# Patient Record
Sex: FEMALE | Race: WHITE | Employment: OTHER | URBAN - METROPOLITAN AREA
[De-identification: names, ages, dates, MRNs, and addresses within clinical notes are randomized per-mention and may not be internally consistent; named-entity substitution may affect disease eponyms.]

---

## 2017-02-21 ENCOUNTER — HOSPITAL ENCOUNTER (OUTPATIENT)
Age: 72
Discharge: HOME | End: 2017-02-21
Payer: MEDICARE

## 2017-02-21 DIAGNOSIS — E78.5 HYPERLIPIDEMIA, UNSPECIFIED HYPERLIPIDEMIA TYPE: ICD-10-CM

## 2017-02-21 LAB
CHOLESTEROL/HDL RATIO: 2.3
CHOLESTEROL: 147 MG/DL
HDLC SERPL-MCNC: 65 MG/DL
LDL CHOLESTEROL: 58 MG/DL (ref 0–130)
TRIGL SERPL-MCNC: 121 MG/DL
VLDLC SERPL CALC-MCNC: NORMAL MG/DL (ref 1–30)

## 2017-02-21 PROCEDURE — 80061 LIPID PANEL: CPT

## 2017-02-21 PROCEDURE — 36415 COLL VENOUS BLD VENIPUNCTURE: CPT

## 2017-10-17 ENCOUNTER — HOSPITAL ENCOUNTER (OUTPATIENT)
Facility: CLINIC | Age: 72
Discharge: HOME | End: 2017-10-17
Payer: MEDICARE

## 2017-10-17 LAB
-: NORMAL
ALBUMIN SERPL-MCNC: 4.2 G/DL (ref 3.5–5.2)
ALBUMIN/GLOBULIN RATIO: 1.6 (ref 1–2.5)
ALP BLD-CCNC: 68 U/L (ref 35–104)
ALT SERPL-CCNC: 15 U/L (ref 5–33)
AMORPHOUS: NORMAL
ANION GAP SERPL CALCULATED.3IONS-SCNC: 12 MMOL/L (ref 9–17)
AST SERPL-CCNC: 16 U/L
BACTERIA: NORMAL
BILIRUB SERPL-MCNC: 0.78 MG/DL (ref 0.3–1.2)
BILIRUBIN URINE: NEGATIVE
BUN BLDV-MCNC: 16 MG/DL (ref 8–23)
BUN/CREAT BLD: NORMAL (ref 9–20)
CALCIUM SERPL-MCNC: 9.1 MG/DL (ref 8.6–10.4)
CASTS UA: NORMAL /LPF (ref 0–8)
CHLORIDE BLD-SCNC: 102 MMOL/L (ref 98–107)
CO2: 26 MMOL/L (ref 20–31)
COLOR: YELLOW
COMMENT UA: ABNORMAL
CREAT SERPL-MCNC: 0.51 MG/DL (ref 0.5–0.9)
CRYSTALS, UA: NORMAL /HPF
EPITHELIAL CELLS UA: NORMAL /HPF (ref 0–5)
ESTIMATED AVERAGE GLUCOSE: 117 MG/DL
GFR AFRICAN AMERICAN: >60 ML/MIN
GFR NON-AFRICAN AMERICAN: >60 ML/MIN
GFR SERPL CREATININE-BSD FRML MDRD: NORMAL ML/MIN/{1.73_M2}
GFR SERPL CREATININE-BSD FRML MDRD: NORMAL ML/MIN/{1.73_M2}
GLUCOSE BLD-MCNC: 96 MG/DL (ref 70–99)
GLUCOSE URINE: NEGATIVE
HBA1C MFR BLD: 5.7 % (ref 4–6)
KETONES, URINE: NEGATIVE
LEUKOCYTE ESTERASE, URINE: ABNORMAL
MUCUS: NORMAL
NITRITE, URINE: NEGATIVE
OTHER OBSERVATIONS UA: NORMAL
PH UA: 5.5 (ref 5–8)
POTASSIUM SERPL-SCNC: 4 MMOL/L (ref 3.7–5.3)
PROTEIN UA: NEGATIVE
RBC UA: NORMAL /HPF (ref 0–4)
RENAL EPITHELIAL, UA: NORMAL /HPF
SODIUM BLD-SCNC: 140 MMOL/L (ref 135–144)
SPECIFIC GRAVITY UA: 1.02 (ref 1–1.03)
TOTAL PROTEIN: 6.8 G/DL (ref 6.4–8.3)
TRICHOMONAS: NORMAL
TSH SERPL DL<=0.05 MIU/L-ACNC: 4.57 MIU/L (ref 0.3–5)
TURBIDITY: CLEAR
URINE HGB: NEGATIVE
UROBILINOGEN, URINE: NORMAL
WBC UA: NORMAL /HPF (ref 0–5)
YEAST: NORMAL

## 2017-10-17 PROCEDURE — 81001 URINALYSIS AUTO W/SCOPE: CPT

## 2017-10-17 PROCEDURE — 84443 ASSAY THYROID STIM HORMONE: CPT

## 2017-10-17 PROCEDURE — 36415 COLL VENOUS BLD VENIPUNCTURE: CPT

## 2017-10-17 PROCEDURE — 80053 COMPREHEN METABOLIC PANEL: CPT

## 2017-10-17 PROCEDURE — 83036 HEMOGLOBIN GLYCOSYLATED A1C: CPT

## 2018-09-08 ENCOUNTER — HOSPITAL ENCOUNTER (OUTPATIENT)
Age: 73
Discharge: HOME | End: 2018-09-08
Payer: MEDICARE

## 2018-09-08 LAB
-: NORMAL
ALBUMIN SERPL-MCNC: 4.4 G/DL (ref 3.5–5.2)
ALBUMIN/GLOBULIN RATIO: 1.6 (ref 1–2.5)
ALP BLD-CCNC: 76 U/L (ref 35–104)
ALT SERPL-CCNC: 18 U/L (ref 5–33)
AMORPHOUS: NORMAL
ANION GAP SERPL CALCULATED.3IONS-SCNC: 11 MMOL/L (ref 9–17)
AST SERPL-CCNC: 19 U/L
BACTERIA: NORMAL
BILIRUB SERPL-MCNC: 0.94 MG/DL (ref 0.3–1.2)
BILIRUBIN URINE: NEGATIVE
BUN BLDV-MCNC: 14 MG/DL (ref 8–23)
BUN/CREAT BLD: NORMAL (ref 9–20)
CALCIUM SERPL-MCNC: 9.5 MG/DL (ref 8.6–10.4)
CASTS UA: NORMAL /LPF (ref 0–8)
CHLORIDE BLD-SCNC: 100 MMOL/L (ref 98–107)
CHOLESTEROL, FASTING: 187 MG/DL
CHOLESTEROL/HDL RATIO: 3.2
CO2: 28 MMOL/L (ref 20–31)
COLOR: YELLOW
COMMENT UA: ABNORMAL
CREAT SERPL-MCNC: 0.62 MG/DL (ref 0.5–0.9)
CRYSTALS, UA: NORMAL /HPF
EPITHELIAL CELLS UA: NORMAL /HPF (ref 0–5)
GFR AFRICAN AMERICAN: >60 ML/MIN
GFR NON-AFRICAN AMERICAN: >60 ML/MIN
GFR SERPL CREATININE-BSD FRML MDRD: NORMAL ML/MIN/{1.73_M2}
GFR SERPL CREATININE-BSD FRML MDRD: NORMAL ML/MIN/{1.73_M2}
GLUCOSE FASTING: 96 MG/DL (ref 70–99)
GLUCOSE URINE: NEGATIVE
HDLC SERPL-MCNC: 59 MG/DL
KETONES, URINE: NEGATIVE
LDL CHOLESTEROL: 101 MG/DL (ref 0–130)
LEUKOCYTE ESTERASE, URINE: ABNORMAL
MUCUS: NORMAL
NITRITE, URINE: NEGATIVE
OTHER OBSERVATIONS UA: NORMAL
PH UA: 6.5 (ref 5–8)
POTASSIUM SERPL-SCNC: 4.1 MMOL/L (ref 3.7–5.3)
PROTEIN UA: NEGATIVE
RBC UA: NORMAL /HPF (ref 0–4)
RENAL EPITHELIAL, UA: NORMAL /HPF
SODIUM BLD-SCNC: 139 MMOL/L (ref 135–144)
SPECIFIC GRAVITY UA: 1.02 (ref 1–1.03)
TOTAL PROTEIN: 7.2 G/DL (ref 6.4–8.3)
TRICHOMONAS: NORMAL
TRIGLYCERIDE, FASTING: 137 MG/DL
TSH SERPL DL<=0.05 MIU/L-ACNC: 4.97 MIU/L (ref 0.3–5)
TURBIDITY: ABNORMAL
URINE HGB: NEGATIVE
UROBILINOGEN, URINE: NORMAL
VLDLC SERPL CALC-MCNC: NORMAL MG/DL (ref 1–30)
WBC UA: NORMAL /HPF (ref 0–5)
YEAST: NORMAL

## 2018-09-08 PROCEDURE — 36415 COLL VENOUS BLD VENIPUNCTURE: CPT

## 2018-09-08 PROCEDURE — 80061 LIPID PANEL: CPT

## 2018-09-08 PROCEDURE — 84443 ASSAY THYROID STIM HORMONE: CPT

## 2018-09-08 PROCEDURE — 80053 COMPREHEN METABOLIC PANEL: CPT

## 2018-09-08 PROCEDURE — 81001 URINALYSIS AUTO W/SCOPE: CPT

## 2018-10-19 ENCOUNTER — HOSPITAL ENCOUNTER (OUTPATIENT)
Age: 73
Discharge: HOME | End: 2018-10-19
Payer: MEDICARE

## 2018-10-19 DIAGNOSIS — E78.5 HYPERLIPIDEMIA, UNSPECIFIED HYPERLIPIDEMIA TYPE: ICD-10-CM

## 2018-10-19 LAB
CHOLESTEROL/HDL RATIO: 3.5
CHOLESTEROL: 211 MG/DL
HDLC SERPL-MCNC: 60 MG/DL
LDL CHOLESTEROL: 132 MG/DL (ref 0–130)
TRIGL SERPL-MCNC: 97 MG/DL
VLDLC SERPL CALC-MCNC: ABNORMAL MG/DL (ref 1–30)

## 2018-10-19 PROCEDURE — 36415 COLL VENOUS BLD VENIPUNCTURE: CPT

## 2018-10-19 PROCEDURE — 80061 LIPID PANEL: CPT

## 2019-10-02 ENCOUNTER — HOSPITAL ENCOUNTER (OUTPATIENT)
Age: 74
Discharge: HOME | End: 2019-10-02
Payer: MEDICARE

## 2019-10-02 LAB
-: NORMAL
ALBUMIN SERPL-MCNC: 4.5 G/DL (ref 3.5–5.2)
ALBUMIN/GLOBULIN RATIO: 1.5 (ref 1–2.5)
ALP BLD-CCNC: 78 U/L (ref 35–104)
ALT SERPL-CCNC: 17 U/L (ref 5–33)
AMORPHOUS: NORMAL
ANION GAP SERPL CALCULATED.3IONS-SCNC: 11 MMOL/L (ref 9–17)
AST SERPL-CCNC: 18 U/L
BACTERIA: NORMAL
BILIRUB SERPL-MCNC: 0.81 MG/DL (ref 0.3–1.2)
BILIRUBIN URINE: NEGATIVE
BUN BLDV-MCNC: 17 MG/DL (ref 8–23)
BUN/CREAT BLD: NORMAL (ref 9–20)
CALCIUM SERPL-MCNC: 9.7 MG/DL (ref 8.6–10.4)
CASTS UA: NORMAL /LPF (ref 0–8)
CHLORIDE BLD-SCNC: 103 MMOL/L (ref 98–107)
CHOLESTEROL, FASTING: 235 MG/DL
CHOLESTEROL/HDL RATIO: 3.7
CO2: 27 MMOL/L (ref 20–31)
COLOR: YELLOW
COMMENT UA: ABNORMAL
CREAT SERPL-MCNC: 0.57 MG/DL (ref 0.5–0.9)
CRYSTALS, UA: NORMAL /HPF
EPITHELIAL CELLS UA: NORMAL /HPF (ref 0–5)
ESTIMATED AVERAGE GLUCOSE: 120 MG/DL
GFR AFRICAN AMERICAN: >60 ML/MIN
GFR NON-AFRICAN AMERICAN: >60 ML/MIN
GFR SERPL CREATININE-BSD FRML MDRD: NORMAL ML/MIN/{1.73_M2}
GFR SERPL CREATININE-BSD FRML MDRD: NORMAL ML/MIN/{1.73_M2}
GLUCOSE BLD-MCNC: 99 MG/DL (ref 70–99)
GLUCOSE URINE: NEGATIVE
HBA1C MFR BLD: 5.8 % (ref 4–6)
HDLC SERPL-MCNC: 64 MG/DL
KETONES, URINE: NEGATIVE
LDL CHOLESTEROL: 151 MG/DL (ref 0–130)
LEUKOCYTE ESTERASE, URINE: ABNORMAL
MUCUS: NORMAL
NITRITE, URINE: NEGATIVE
OTHER OBSERVATIONS UA: NORMAL
PH UA: 6.5 (ref 5–8)
POTASSIUM SERPL-SCNC: 4 MMOL/L (ref 3.7–5.3)
PROTEIN UA: NEGATIVE
RBC UA: NORMAL /HPF (ref 0–4)
RENAL EPITHELIAL, UA: NORMAL /HPF
SODIUM BLD-SCNC: 141 MMOL/L (ref 135–144)
SPECIFIC GRAVITY UA: 1.02 (ref 1–1.03)
TOTAL PROTEIN: 7.6 G/DL (ref 6.4–8.3)
TRICHOMONAS: NORMAL
TRIGLYCERIDE, FASTING: 100 MG/DL
TSH SERPL DL<=0.05 MIU/L-ACNC: 6.3 MIU/L (ref 0.3–5)
TURBIDITY: CLEAR
URINE HGB: NEGATIVE
UROBILINOGEN, URINE: NORMAL
VLDLC SERPL CALC-MCNC: ABNORMAL MG/DL (ref 1–30)
WBC UA: NORMAL /HPF (ref 0–5)
YEAST: NORMAL

## 2019-10-02 PROCEDURE — 81001 URINALYSIS AUTO W/SCOPE: CPT

## 2019-10-02 PROCEDURE — 36415 COLL VENOUS BLD VENIPUNCTURE: CPT

## 2019-10-02 PROCEDURE — 80053 COMPREHEN METABOLIC PANEL: CPT

## 2019-10-02 PROCEDURE — 83036 HEMOGLOBIN GLYCOSYLATED A1C: CPT

## 2019-10-02 PROCEDURE — 84443 ASSAY THYROID STIM HORMONE: CPT

## 2019-10-02 PROCEDURE — 80061 LIPID PANEL: CPT

## 2019-12-11 PROBLEM — Z78.9 STATIN INTOLERANCE: Status: ACTIVE | Noted: 2019-12-11

## 2020-10-17 ENCOUNTER — HOSPITAL ENCOUNTER (OUTPATIENT)
Age: 75
Discharge: HOME | End: 2020-10-17
Payer: MEDICARE

## 2020-10-17 LAB
-: NORMAL
ALBUMIN SERPL-MCNC: 4.5 G/DL (ref 3.5–5.2)
ALBUMIN/GLOBULIN RATIO: 1.8 (ref 1–2.5)
ALP BLD-CCNC: 88 U/L (ref 35–104)
ALT SERPL-CCNC: 15 U/L (ref 5–33)
AMORPHOUS: NORMAL
ANION GAP SERPL CALCULATED.3IONS-SCNC: 13 MMOL/L (ref 9–17)
AST SERPL-CCNC: 17 U/L
BACTERIA: NORMAL
BILIRUB SERPL-MCNC: 0.72 MG/DL (ref 0.3–1.2)
BILIRUBIN URINE: NEGATIVE
BUN BLDV-MCNC: 12 MG/DL (ref 8–23)
BUN/CREAT BLD: NORMAL (ref 9–20)
CALCIUM SERPL-MCNC: 9.5 MG/DL (ref 8.6–10.4)
CASTS UA: NORMAL /LPF (ref 0–8)
CHLORIDE BLD-SCNC: 103 MMOL/L (ref 98–107)
CHOLESTEROL, FASTING: 255 MG/DL
CHOLESTEROL/HDL RATIO: 3.5
CO2: 26 MMOL/L (ref 20–31)
COLOR: YELLOW
COMMENT UA: ABNORMAL
CREAT SERPL-MCNC: 0.55 MG/DL (ref 0.5–0.9)
CRYSTALS, UA: NORMAL /HPF
EPITHELIAL CELLS UA: NORMAL /HPF (ref 0–5)
GFR AFRICAN AMERICAN: >60 ML/MIN
GFR NON-AFRICAN AMERICAN: >60 ML/MIN
GFR SERPL CREATININE-BSD FRML MDRD: NORMAL ML/MIN/{1.73_M2}
GFR SERPL CREATININE-BSD FRML MDRD: NORMAL ML/MIN/{1.73_M2}
GLUCOSE FASTING: 92 MG/DL (ref 70–99)
GLUCOSE URINE: NEGATIVE
HDLC SERPL-MCNC: 72 MG/DL
KETONES, URINE: NEGATIVE
LDL CHOLESTEROL: 160 MG/DL (ref 0–130)
LEUKOCYTE ESTERASE, URINE: ABNORMAL
MUCUS: NORMAL
NITRITE, URINE: NEGATIVE
OTHER OBSERVATIONS UA: NORMAL
PH UA: 6.5 (ref 5–8)
POTASSIUM SERPL-SCNC: 4.1 MMOL/L (ref 3.7–5.3)
PROTEIN UA: NEGATIVE
RBC UA: NORMAL /HPF (ref 0–4)
RENAL EPITHELIAL, UA: NORMAL /HPF
SODIUM BLD-SCNC: 142 MMOL/L (ref 135–144)
SPECIFIC GRAVITY UA: 1.01 (ref 1–1.03)
TOTAL PROTEIN: 7 G/DL (ref 6.4–8.3)
TRICHOMONAS: NORMAL
TRIGLYCERIDE, FASTING: 115 MG/DL
TSH SERPL DL<=0.05 MIU/L-ACNC: 5.27 MIU/L (ref 0.3–5)
TURBIDITY: CLEAR
URINE HGB: NEGATIVE
UROBILINOGEN, URINE: NORMAL
VLDLC SERPL CALC-MCNC: ABNORMAL MG/DL (ref 1–30)
WBC UA: NORMAL /HPF (ref 0–5)
YEAST: NORMAL

## 2020-10-17 PROCEDURE — 84443 ASSAY THYROID STIM HORMONE: CPT

## 2020-10-17 PROCEDURE — 80053 COMPREHEN METABOLIC PANEL: CPT

## 2020-10-17 PROCEDURE — 83036 HEMOGLOBIN GLYCOSYLATED A1C: CPT

## 2020-10-17 PROCEDURE — 80061 LIPID PANEL: CPT

## 2020-10-17 PROCEDURE — 36415 COLL VENOUS BLD VENIPUNCTURE: CPT

## 2020-10-17 PROCEDURE — 81001 URINALYSIS AUTO W/SCOPE: CPT

## 2020-10-18 LAB
ESTIMATED AVERAGE GLUCOSE: 126 MG/DL
HBA1C MFR BLD: 6 % (ref 4–6)

## 2020-12-18 ENCOUNTER — HOSPITAL ENCOUNTER (OUTPATIENT)
Age: 75
Setting detail: SPECIMEN
Discharge: HOME | End: 2020-12-18
Payer: MEDICARE

## 2020-12-18 ENCOUNTER — HOSPITAL ENCOUNTER (OUTPATIENT)
Age: 75
Discharge: HOME | End: 2020-12-18
Payer: MEDICARE

## 2020-12-20 LAB — HIV AG/AB: NONREACTIVE

## 2021-09-03 ENCOUNTER — HOSPITAL ENCOUNTER (OUTPATIENT)
Age: 76
Discharge: HOME | End: 2021-09-05
Payer: MEDICARE

## 2021-09-03 ENCOUNTER — HOSPITAL ENCOUNTER (OUTPATIENT)
Dept: GENERAL RADIOLOGY | Age: 76
Discharge: HOME | End: 2021-09-05
Payer: MEDICARE

## 2021-09-03 DIAGNOSIS — M25.552 LEFT HIP PAIN: ICD-10-CM

## 2021-09-03 DIAGNOSIS — M54.42 LOW BACK PAIN WITH LEFT-SIDED SCIATICA, UNSPECIFIED BACK PAIN LATERALITY, UNSPECIFIED CHRONICITY: ICD-10-CM

## 2021-09-03 PROCEDURE — 73502 X-RAY EXAM HIP UNI 2-3 VIEWS: CPT

## 2021-09-03 PROCEDURE — 72100 X-RAY EXAM L-S SPINE 2/3 VWS: CPT

## 2021-09-20 ENCOUNTER — HOSPITAL ENCOUNTER (OUTPATIENT)
Dept: PHYSICAL THERAPY | Facility: CLINIC | Age: 76
Setting detail: THERAPIES SERIES
Discharge: HOME | End: 2021-09-20
Payer: MEDICARE

## 2021-09-20 PROCEDURE — 97140 MANUAL THERAPY 1/> REGIONS: CPT

## 2021-09-20 PROCEDURE — 97161 PT EVAL LOW COMPLEX 20 MIN: CPT

## 2021-09-20 PROCEDURE — 97110 THERAPEUTIC EXERCISES: CPT

## 2021-09-20 NOTE — CONSULTS
[] [facility]) - [facility] &  Therapy  [address]  [phone]  F: [phone] [] [address]  [facility]   [address]  P: [phone]  F: [phone] [] [address] &  Therapy  [address]  P: [phone]  F: [phone] [] [address]  P: [phone]  F: [phone] [] [address]  [facility]   [address]   [place]: [phone]  F: [phone]      Physical Therapy Spine Evaluation    Date:  2021  Patient: Brea Loya  :   MRN: [id number]  Physician: Dr. Karlee Lora: Kassy Ledbetter ($0 copay, ded met, 20%)  Medical Diagnosis: Acute back pain  Rehab Codes: M54.5  Onset Date: 21  Next 's appt.: none    Subjective:   CC:L ant hip and L medial lower leg pain  HPI: saw Dr Violeta Hernandez at [facility] who recommended surgery. Was fine for 5 years. Then recently, it flared up. L buttock and groin/ant hip pain. Was walking 2 miles/day for past year. Then 6 wks ago, reduced walking to 1/2 mile due to L knee pain, calf and N/T down medial leg. Enjoys reading, yoga, and swim without issues. Goal is to get back to walking 2 miles/day. L BON feels like it wont' hold her and has increased fear of falling.       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  There are 5 lumbar-type   vertebral bodies without ribs.  The pedicles and posterior spinous processes   appear intact.  There is approximately 12 mm anterolisthesis L5 on S1 with   marked narrowing of the L5-S1 disc space and prominent osteophytic changes. Bilateral pars defects at this level.  There is approximately 3 mm   anterolisthesis L3 on L4 approximately 4 mm posterior listhesis L4 and L5. There is some narrowing of the L3-4 disc space.  Some degenerative changes in   the vertebral endplates throughout.  SI joints are unremarkable in appearance.       The left hip appears unremarkable and the joint space also appears   unremarkable.  Normal alignment.  Also noted are vascular calcifications in   the soft tissues.           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       Unremarkable appearance of the left hip.       No evidence of acute abnormality. Alignment: A 8mm anterolisthesis of L5 on S1.          Disk Spaces: L5-S1 severe disk height loss with endplate sclerosis.       Pars/Facets: Right-sided L5 spondylolysis.        Other: None.           Impression       Grade 1 anterolisthesis of L5 on S1.  Right-sided L5 spondylolysis.  Severe L5-S1 disk height loss.            [] MRI:   At L3-L4 there is a posterior disc bulge, facet hypertrophy and thickening of ligamentum flavum.  Findings result in moderate narrowing of the spinal canal, mild narrowing of the left neuroforamen and mild narrowing of the right neuroforamen.    At L4-L5 there is a posterior disc bulge, facet hypertrophy and thickening of ligamentum flavum.  Findings result in mild narrowing of the spinal canal, and mild narrowing of the spinal canal.   At L5-S1 there is a posterior disc bulge, facet hypertrophy and thickening of ligamentum flavum.  Findings result in mild narrowing of the spinal canal and severe narrowing of the neuroforamen.           Impression   1.  Multilevel degenerative changes most pronounced at L5-S1 where there is mild narrowing of the spinal canal and severe narrowing of the neuroforamen.            [] Other:    Medications: [x] Refer to full medical record [] None [] Other:  Allergies:      [x] Refer to full medical record [] None [] Other:    Function:  Hand Dominance  [] Right  [] Left  Patient lives with: alone   In what type of home []  One story   [x] Two story   [] Split level   Number of stairs to enter 1    With handrail on the []  Right to enter   [] Left to enter   Bathroom has a []  Tub only  [x] Tub/shower combo   [] Walk in shower    []  Grab bars   Washing machine is on []  Main level   [] Second level   [] Basement   Employer retired   Job Status []  Normal duty   [] Light duty   [] Off due to condition    []  Retired   [] Not employed   [] Disability  [] Other:  []  Return to work:    Work activities/duties Walk up to 2 miles       ADL/IADL Previous level of function Current level of function Who currently assists the patient with task   Bathing  [] Independent  [] Assist [x] Independent  [] Assist    Dress/grooming [] Independent  [] Assist [x] Independent  [] Assist    Transfer/mobility [] Independent  [] Assist [x] Independent  [] Assist    Feeding [] Independent  [] Assist [x] Independent  [] Assist    Toileting [] Independent  [] Assist [x] Independent  [] Assist    Driving [] Independent  [] Assist [x] Independent  [] Assist    Housekeeping [] Independent  [] Assist [x] Independent  [] Assist    Grocery shop/meal prep [] Independent  [] Assist [x] Independent  [] Assist      Gait Prior level of function Current level of function    [] Independent  [] Assist [x] Independent  [] Assist   Device: [] Independent [x] Independent    [] Straight Cane [] Quad cane [] Straight Cane [] Quad cane    [] Standard walker [] Rolling walker   [] 4 wheeled walker [] Standard walker [] Rolling walker   [] 4 wheeled walker    [] Wheelchair [] Wheelchair     Pain:  [x] Yes  [] No Location: L ant hip, L medial lower leg Pain Rating: (0-10 scale) 2/10  Pain altered Tx:  [] Yes  [x] No  Action:    Symptoms:  [x] Improving [] Worsening [] Same  Better:  [] AM    [] PM    [] Sit    [] Rise/Sit    []Stand    [] Walk    [] Lying    [] Other:  Worse: [] AM    [] PM    [] Sit    [] Rise/Sit    []Stand    [x] Walk    [] Lying    [] Bend                      [] Valsalva    [] Other:  Sleep: [] OK    [x] Disturbed due to apnea    Objective:   STRENGTH  ROM    Left Right     L1-2 Hip Flex       Hip Abd       L3-4 Knee Ext        L4 Ankle DF        L5 EHL       S1 Plant. Flex   Lumbar    Abdominals   Flexion wfl   Erector Spinae   Extension ltd      Rotation L  R      Sidebend L R      UE/LE                                               TESTS (+/-) LEFT RIGHT Not Tested   SLR [] sit [x] supine neg neg []   Hamstring (SLR)   []   SKTC neg neg []   DKTC   []   Slump/Dural   []   SI JT   []   ETHEL neg neg []       OBSERVATION No Deficit Deficit Not Tested Comments   Posture       Forward Head [] [] []    Rounded Shoulders [] [] []    Kyphosis [] [] []    Lordosis [] [] []    Lateral Shift [] [] []    Scoliosis [] [] []    Iliac Crest [] [] []    PSIS [] [] []    ASIS [] [] []    Genu Valgus [] [] []    Genu Varus [] [] []    Genu Recurvatum [] [] []    Pronation [] [] []    Supination [] [] []    Leg Length Discrp [] [] []    Slumped Sitting [] [] []    Palpation [] [x] [] L buttocks, L calf   Sensation [] [] []    Edema [x] [] []    Neurological [x] [] []        Functional Test: LEFI Score: 1% functionally impaired     Comments:    Assessment:  Patient would benefit from skilled physical therapy services in order to: decrease L hip and lower leg symptoms    Problems:    [x] ? Pain:  [x] ? ROM:  [x] ? Strength:  [x] ? Function:  [] Other:      STG: (to be met in 10 treatments)  1. ? paresthesias into L lower leg  2. ? ROM: of L spine and hip   3. ? Strength: to at least 4/5 with L hip ext and abd.  4. ? Functional tolerance of sitting and standing  5. Patient to be independent with home exercise program as demonstrated by performance with correct form without cues. 6. Demonstrate Knowledge of fall prevention  LTG: (to be met in 20 treatments)  1. Minimal to no feelings of falling  2. Minimal to no parasthesias into L LE      Patient goals: :keep from falling\"     Rehab Potential:  [x] Good  [] Fair  [] Poor   Suggested Professional Referral:  [x] No  [] Yes:  Barriers to Goal Achievement:  [] No  [] Yes:  Domestic Concerns:  [x] No  [] Yes:    Pt. Education:  [x] Plans/Goals, Risks/Benefits discussed  [x] Home exercise program  Method of Education: [] Verbal  [] Demo  [x] Written  Comprehension of Education:  [] Verbalizes understanding. [] Demonstrates understanding. [x] Needs Review. [] Demonstrates/verbalizes understanding of HEP/Ed previously given.     Treatment Plan:  [x] Therapeutic Exercise   11723  [] Iontophoresis: 4 mg/mL Dexamethasone Sodium Phosphate  mAmin  84588   [x] Therapeutic Activity  83490 [] Vasopneumatic cold with compression  30808    [x] Gait Training   89026 [] Ultrasound   33089   [x] Neuromuscular Re-education  42156 [] Electrical Stimulation Unattended  89661   [x] Manual Therapy  21505 [] Electrical Stimulation Attended  92523   [x] Instruction in HEP  [] Lumbar/Cervical Traction  99534   [x] Aquatic Therapy   32683 [] Cold/hotpack    [] Massage   11620      [] Dry Needling, 1 or 2 muscles  79932   [] Biofeedback, first 15 minutes   26727  [] Biofeedback, additional 15 minutes   46394 [] Dry Needling, 3 or more muscles  08627       Frequency:  2x/week for20 visits    Todays Treatment:  Modalities: none  Precautions: standard  Exercises:  Exercise Reps/ Time Weight/ Level completed Comments   Nustep *      Supine        Bridges 2x10  x    Wero stretch 3x30\"  x    Glut sets *      Prone hip ext 2x10  x           Sidelying       Hip abd 2x10  x    Clamshells 2x10  x Gym       Sit to stand from chair 2x10      Lunges *      Lateral step downs * 4\"            Other:  Manual  1. IASTM to L buttock, L calf, L quad  2.  DI to L hip flexor     Specific Instructions for next treatment:  1. Continue with manual and add ex for       Evaluation Complexity:  History (Personal factors, comorbidities) [x] 0 [] 1-2 [] 3+   Exam (limitations, restrictions) [x] 1-2 [] 3 [] 4+   Clinical presentation (progression) [x] Stable [] Evolving  [] Unstable   Decision Making [x] Low [] Moderate [] High    [x] Low Complexity [] Moderate Complexity [] High Complexity       Treatment Charges: Mins Units   [x] Evaluation       [x]  Low       []  Moderate       []  High  1   []  Modalities     [x]  Ther Exercise 15 1   [x]  Manual Therapy 10 1   []  Ther Activities     []  Aquatics     []  Vasocompression     []  Other       TOTAL TREATMENT TIME: 57    Time in: 1500     Time out: 1600    Electronically signed by: Erick Griffin PT        Physician Signature:________________________________Date:__________________  By signing above or cosigning this note, I have reviewed this plan of care and certify a need for medically necessary rehabilitation services.      *PLEASE SIGN ABOVE AND FAX BACK ALL PAGES*

## 2021-09-23 ENCOUNTER — HOSPITAL ENCOUNTER (OUTPATIENT)
Dept: PHYSICAL THERAPY | Facility: CLINIC | Age: 76
Setting detail: THERAPIES SERIES
Discharge: HOME | End: 2021-09-23
Payer: MEDICARE

## 2021-09-23 PROCEDURE — 97140 MANUAL THERAPY 1/> REGIONS: CPT

## 2021-09-23 PROCEDURE — 97110 THERAPEUTIC EXERCISES: CPT

## 2021-09-23 NOTE — FLOWSHEET NOTE
[] [facility]) [facility]  [facility]  [address]  [phone]  F: [phone] [] [address]  [facility] [address]   [address]  P: [phone]  F: [phone] [] [address] &  Therapy  [address]  P: [phone]  F: [phone] [] [address]  P: [phone]  F: [phone] [] [address]  [facility]   [address]   [place]: [phone]  F: [phone]      Physical Therapy Daily Treatment Note    Date:  2021  Patient Name:  Greta Quezada    :  1945  MRN: [id number]  Physician: Dr. Jody Hudson: Magdalene Patel ($0 copay, ded met, 20%)  Medical Diagnosis: Acute back pain                        Rehab Codes: M54.5  Onset Date: 21               Next 's appt.: none  Visit# / total visits: ; Progress note for Medicare patient due at visit 10     Cancels/No Shows:     Subjective:    Pain:  [x] Yes  [] No Location: L glut, tailbone Pain Rating: (0-10 scale) 2-3/10  Pain altered Tx:  [] No  [] Yes  Action:  Comments: Pt states she is very sore following the hypervolt from last visit. Objective:  Modalities: none  Precautions: standard  Exercises:  Exercise Reps/ Time Weight/ Level completed Comments   Nustep 5' L2 x     Calf stretch 3x30\"  x           Supine            Piriformis Stretch 3x30\"  x    Bridges 2x10   x     Wero stretch 1'   x     Glut sets 10x10\"   x     Prone hip ext 2x10   -                 Sidelying           Hip abd 2x10 orange x     Clamshells 2x10 Orange x                 Gym           Mat Taps 2x10   x     Lunges x10 B   x     Lateral step downs 2x10 L 4\" x     tband sidestepping 2L orange x     Other:  Manual  1.   IASTM to L buttock, L calf, L quad  2.  DI to L hip flexor      Specific Instructions for next treatment:  1. Continue with manual and add ex for          Treatment Charges: Mins Units   []  Modalities     [x]  Ther Exercise 35 2   [x]  Manual Therapy 10 1   []  Ther Activities     []  Aquatics     []  Vasocompression     []  Other     Total Treatment time 45 3       Assessment: [x] Progressing toward goals. Initiated session with Nustep warm up followed by manual. Most TTP at hip flexor and L glut. Cont with mat ex as noted in log, added tband to clamshells and hip abd. Noted increased N/T sx with SL abd, mat taps, and lateral step downs. Needs cues to avoid compensating with quad during lateral hip ex. Reviewed HEP and given orange tband. [] No change. [] Other:  [x] Patient would continue to benefit from skilled physical therapy services in order to: meet goals listed below    STG: (to be met in 10 treatments)  1. ? paresthesias into L lower leg  2. ? ROM: of L spine and hip   3. ? Strength: to at least 4/5 with L hip ext and abd.  4. ? Functional tolerance of sitting and standing  5. Patient to be independent with home exercise program as demonstrated by performance with correct form without cues. 6. Demonstrate Knowledge of fall prevention  LTG: (to be met in 20 treatments)  1. Minimal to no feelings of falling  2. Minimal to no parasthesias into L LE     Pt. Education:  [x] Yes  [] No  [x] Reviewed Prior HEP/Ed  Method of Education: [x] Verbal  [x] Demo  [] Written  Comprehension of Education:  [x] Verbalizes understanding. [x] Demonstrates understanding. [] Needs review. [] Demonstrates/verbalizes HEP/Ed previously given. Plan: [x] Continue current frequency toward long and short term goals.     [] Specific Instructions for subsequent treatments:       Time In: 10:02 am            Time Out: 10:58 am    Electronically signed by:  Destini Sebastian PTA

## 2021-09-27 ENCOUNTER — HOSPITAL ENCOUNTER (OUTPATIENT)
Dept: PHYSICAL THERAPY | Facility: CLINIC | Age: 76
Setting detail: THERAPIES SERIES
Discharge: HOME | End: 2021-09-27
Payer: MEDICARE

## 2021-09-27 PROCEDURE — 97110 THERAPEUTIC EXERCISES: CPT

## 2021-09-27 PROCEDURE — 97140 MANUAL THERAPY 1/> REGIONS: CPT

## 2021-09-27 NOTE — FLOWSHEET NOTE
[] [facility]) [facility]  [facility]  [address]  [phone]  F: [phone] [] [address]  [place] [facility] [address]   [address]  P: [phone]  F: [phone] [] [address] &  Therapy  [address] [facility] [address]  P: [phone]  F: [phone] [] [address]  P: [phone]  F: [phone] [] [address]  [facility]   [address]   [place]: [phone]  F: [phone]      Physical Therapy Daily Treatment Note    Date:  2021  Patient Name:  Lissy Olivares    :  1945  MRN: [id number]  Physician: Dr. Paz Manton: [id number] ($0 copay, ded met, 20%)  Medical Diagnosis: Acute back pain                        Rehab Codes: M54.5  Onset Date: 21               Next 's appt.: none  Visit# / total visits: 3/20; Progress note for Medicare patient due at visit 10     Cancels/No Shows:     Subjective:    Pain:  [x] Yes  [] No Location: L glut, tailbone Pain Rating: (0-10 scale) -/10  Pain altered Tx:  [] No  [] Yes  Action:  Comments: Pt states hip is less tender. Having some numbness down LLE but notices it is less since starting PT.     Objective:  Modalities: none  Precautions: standard  Exercises:  Exercise Reps/ Time Weight/ Level completed Comments   Nustep 5' L2 x     Calf stretch 3x30\"             Supine            Piriformis Stretch 3x30\"  x    Bridges 2x10  orange x     Wero stretch 1'   x     Glut sets 10x10\"   x     Prone hip ext 2x10   x Knee bent & straight               Sidelying           Hip abd 2x10 orange x     Clamshells 2x10 Orange x                 Gym           Mat Taps 2x10   x     Lunges x10 B   x     Lateral step downs 2x10 L 4\" x     tband sidestepping 2L orange x   Other: Manual  1. IASTM to L buttock, L calf, L quad  2.  DI to L hip flexor       Specific Instructions for next treatment:    Treatment Charges: Mins Units   []  Modalities     [x]  Ther Exercise 35 2   [x]  Manual Therapy 12 1   []  Ther Activities     []  Aquatics     []  Vasocompression     []  Other     Total Treatment time 47 3       Assessment: [x] Progressing toward goals. Initiated session with Nustep warm up followed by manual. Most tender at hip flexor and piriformis. Cont with strengthening as noted in log above with addition of 3 way hip. Cues needed for controlled movements as well as proper knee/hip alignment with ex. Less N/T this date, only occurring during lateral step down.     [] No change. [] Other:  [x] Patient would continue to benefit from skilled physical therapy services in order to: meet goals listed below    STG: (to be met in 10 treatments)  1. ? paresthesias into L lower leg  2. ? ROM: of L spine and hip   3. ? Strength: to at least 4/5 with L hip ext and abd.  4. ? Functional tolerance of sitting and standing  5. Patient to be independent with home exercise program as demonstrated by performance with correct form without cues. 6. Demonstrate Knowledge of fall prevention  LTG: (to be met in 20 treatments)  1. Minimal to no feelings of falling  2. Minimal to no parasthesias into L LE     Pt. Education:  [x] Yes  [] No  [x] Reviewed Prior HEP/Ed  Method of Education: [x] Verbal  [x] Demo  [] Written  Comprehension of Education:  [x] Verbalizes understanding. [x] Demonstrates understanding. [] Needs review. [] Demonstrates/verbalizes HEP/Ed previously given. Plan: [x] Continue current frequency toward long and short term goals.     [] Specific Instructions for subsequent treatments:       Time In: 3:00 pm        Time Out: 3:56 pm    Electronically signed by:  Starr Jones PTA

## 2021-09-30 ENCOUNTER — HOSPITAL ENCOUNTER (OUTPATIENT)
Dept: PHYSICAL THERAPY | Facility: CLINIC | Age: 76
Setting detail: THERAPIES SERIES
Discharge: HOME | End: 2021-09-30
Payer: MEDICARE

## 2021-09-30 PROCEDURE — 97140 MANUAL THERAPY 1/> REGIONS: CPT

## 2021-09-30 PROCEDURE — 97110 THERAPEUTIC EXERCISES: CPT

## 2021-09-30 NOTE — FLOWSHEET NOTE
[] [facility]) [facility]  [facility]  [address]  [phone]  F: [phone] [] [address]  [facility] [address]   [address]  P: [phone]  F: [phone] [] [address] &  Therapy  [address]  P: [phone]  F: [phone] [] [address]  P: [phone]  F: [phone] [] [address]  [facility]   [address]   [place]: [phone]  F: [phone]      Physical Therapy Daily Treatment Note    Date:  2021  Patient Name:  Elo Vera    :  1945  MRN: [id number]  Physician: Dr. Sharp Overall: [place] ($0 copay, ded met, 20%)  Medical Diagnosis: Acute back pain                        Rehab Codes: M54.5  Onset Date: 21               Next 's appt.: none  Visit# / total visits: ; Progress note for Medicare patient due at visit 10     Cancels/No Shows:     Subjective:    Pain:  [x] Yes  [] No Location: L glut, tailbone Pain Rating: (0-10 scale) -/10  Pain altered Tx:  [] No  [] Yes  Action:  Comments: No pain to report at arrival but pt is having some N/T down the LLE. Objective:  Modalities: none  Precautions: standard  Exercises:  Exercise Reps/ Time Weight/ Level completed Comments   Nustep 5' L2 x     Calf stretch 3x30\"             Supine            Piriformis Stretch 3x30\"  x    Bridges 2x10 lime x     Wero stretch 1' ea   x     Glut sets 10x10\"        Prone hip ext 2x10    Knee bent & straight               Sidelying           Hip abd 2x10 lime x     Clamshells 2x10 lime x                 Gym           Mat Taps 2x10   x    Lunges 2x10 B   x     Lateral step downs 2x10 L 4\" x     tband sidestepping 2L lime x     Other:  Manual  1.   IASTM to L buttock, L calf, L quad  2.  DI to L hip flexor       Specific Instructions for next treatment:    Treatment Charges: Mins Units   []  Modalities     [x]  Ther Exercise 40 3   [x]  Manual Therapy 15 1   []  Ther Activities     []  Aquatics     []  Vasocompression     []  Other     Total Treatment time 55 4       Assessment: [x] Progressing toward goals. Cont with manual noted above. DI to proximal hip flexor followed by hypervolt to quad, buttock, and calf. Able to increase tband resistance across program with good tolerance. Did have increased N/T down LLE with mat taps and tband sidestepping but diminished after adjusting technique to include more hip hinge. Reviewed HEP and given lime tband. [] No change. [] Other:  [x] Patient would continue to benefit from skilled physical therapy services in order to: meet goals listed below    STG: (to be met in 10 treatments)  1. ? paresthesias into L lower leg  2. ? ROM: of L spine and hip   3. ? Strength: to at least 4/5 with L hip ext and abd.  4. ? Functional tolerance of sitting and standing  5. Patient to be independent with home exercise program as demonstrated by performance with correct form without cues. 6. Demonstrate Knowledge of fall prevention  LTG: (to be met in 20 treatments)  1. Minimal to no feelings of falling  2. Minimal to no parasthesias into L LE     Pt. Education:  [x] Yes  [] No  [x] Reviewed Prior HEP/Ed  Method of Education: [x] Verbal  [x] Demo  [] Written  Comprehension of Education:  [x] Verbalizes understanding. [x] Demonstrates understanding. [] Needs review. [] Demonstrates/verbalizes HEP/Ed previously given. Plan: [x] Continue current frequency toward long and short term goals.     [] Specific Instructions for subsequent treatments:       Time In: 9:03 am        Time Out: 10:00 am    Electronically signed by:  Alba Frank PTA

## 2021-10-04 ENCOUNTER — HOSPITAL ENCOUNTER (OUTPATIENT)
Dept: PHYSICAL THERAPY | Facility: CLINIC | Age: 76
Setting detail: THERAPIES SERIES
Discharge: HOME | End: 2021-10-04
Payer: MEDICARE

## 2021-10-04 PROCEDURE — 97110 THERAPEUTIC EXERCISES: CPT

## 2021-10-04 PROCEDURE — 97140 MANUAL THERAPY 1/> REGIONS: CPT

## 2021-10-04 NOTE — FLOWSHEET NOTE
[] [facility]) [facility]  [address]  [phone]  F: [phone] [] [address]  [address]   [address]  P: [phone]  F: [phone] [] [address] &  Therapy  [address]  P: [phone]  F: [phone] [] [address]  P: [phone]  F: [phone] [] [address]  [facility]   [address]   [place]: [phone]  F: [phone]      Physical Therapy Daily Treatment Note    Date:  10/4/2021  Patient Name:  To Kothari    :  1945  MRN: [id number]  Physician: Dr. Laurie Moreno: Vane Lee ($0 copay, ded met, 20%)  Medical Diagnosis: Acute back pain                        Rehab Codes: M54.5  Onset Date: 21               Next 's appt.: none  Visit# / total visits: ; Progress note for Medicare patient due at visit 10     Cancels/No Shows:     Subjective:    Pain:  [x] Yes  [] No Location: L glut, tailbone Pain Rating: (0-10 scale) -/10  Pain altered Tx:  [] No  [] Yes  Action:  Comments: No pain to report at arrival. Continuing to have random N/T down the LLE. Objective:  Modalities: none  Precautions: standard  Exercises:  Exercise Reps/ Time Weight/ Level completed Comments   Nustep 5' L2 x     Calf stretch 3x30\"             Supine            Piriformis Stretch 3x30\"  x    Bridges 2x15 lime x     Wero stretch 1' ea   x     Glut sets 10x10\"        Prone hip ext 2x10    Knee bent & straight               Sidelying           Hip abd 2x15 lime x     Clamshells 2x15 lime x                 Gym           Mat Taps 2x15   x    Lunges x15 B   x     Lateral step downs 2x15 L 4\" x     tband sidestepping 2L lime x     Other:  Manual  1.   IASTM to L buttock, L quad  2.  DI to L hip flexor       Specific Instructions for next treatment:    Treatment Charges: Mins Units   []  Modalities     [x]  Ther Exercise 35 2   [x]  Manual Therapy 15 1   []  Ther Activities     []  Aquatics     []  Vasocompression     []  Other     Total Treatment time 50 3       Assessment: [x] Progressing toward goals. Cont with manual noted above. DI to proximal hip flexor and piriformis followed by hypervolt to quad and buttock. Able to increase reps across mat program with good tolerance. Did have increased N/T with SL hip abduction, step downs, and tband sidestepping. [] No change. [] Other:  [x] Patient would continue to benefit from skilled physical therapy services in order to: meet goals listed below    STG: (to be met in 10 treatments)  1. ? paresthesias into L lower leg  2. ? ROM: of L spine and hip   3. ? Strength: to at least 4/5 with L hip ext and abd.  4. ? Functional tolerance of sitting and standing  5. Patient to be independent with home exercise program as demonstrated by performance with correct form without cues. 6. Demonstrate Knowledge of fall prevention  LTG: (to be met in 20 treatments)  1. Minimal to no feelings of falling  2. Minimal to no parasthesias into L LE     Pt. Education:  [x] Yes  [] No  [x] Reviewed Prior HEP/Ed  Method of Education: [x] Verbal  [x] Demo  [] Written  Comprehension of Education:  [x] Verbalizes understanding. [x] Demonstrates understanding. [] Needs review. [] Demonstrates/verbalizes HEP/Ed previously given. Plan: [x] Continue current frequency toward long and short term goals.     [] Specific Instructions for subsequent treatments:       Time In: 3:00 pm        Time Out: 3:55 pm    Electronically signed by:  Tammi Warner PTA

## 2021-10-07 ENCOUNTER — HOSPITAL ENCOUNTER (OUTPATIENT)
Dept: PHYSICAL THERAPY | Facility: CLINIC | Age: 76
Setting detail: THERAPIES SERIES
Discharge: HOME | End: 2021-10-07
Payer: MEDICARE

## 2021-10-07 PROCEDURE — 97140 MANUAL THERAPY 1/> REGIONS: CPT

## 2021-10-07 PROCEDURE — 97110 THERAPEUTIC EXERCISES: CPT

## 2021-10-07 NOTE — FLOWSHEET NOTE
step downs 2x15 L 4\" x     tband sidestepping 2L lime x     Other:  Manual  1. IASTM to L buttock, L quad  2.  DI to L hip flexor       Specific Instructions for next treatment:    Treatment Charges: Mins Units   []  Modalities     [x]  Ther Exercise 40 3   [x]  Manual Therapy 15 1   []  Ther Activities     []  Aquatics     []  Vasocompression     []  Other     Total Treatment time 55 4       Assessment: [x] Progressing toward goals. Does not tolerate calf stretch due to increase N/T L LE. When prone during IASTM to left buttock patient reports a decrease of symptoms down left LE. L LE symptoms abolished post mat activity but brought back on by mat taps. L LE symptoms exacerbated by L LE heel taps. [] No change. [] Other:  [x] Patient would continue to benefit from skilled physical therapy services in order to: meet goals listed below    STG: (to be met in 10 treatments)  1. ? paresthesias into L lower leg  2. ? ROM: of L spine and hip   3. ? Strength: to at least 4/5 with L hip ext and abd.  4. ? Functional tolerance of sitting and standing  5. Patient to be independent with home exercise program as demonstrated by performance with correct form without cues. 6. Demonstrate Knowledge of fall prevention  LTG: (to be met in 20 treatments)  1. Minimal to no feelings of falling  2. Minimal to no parasthesias into L LE     Pt. Education:  [x] Yes  [] No  [x] Reviewed Prior HEP/Ed  Method of Education: [x] Verbal  [x] Demo  [] Written  Comprehension of Education:  [x] Verbalizes understanding. [x] Demonstrates understanding. [] Needs review. [] Demonstrates/verbalizes HEP/Ed previously given. Plan: [x] Continue current frequency toward long and short term goals.     [] Specific Instructions for subsequent treatments:       Time In: 3:00 pm        Time Out: 4:00 pm    Electronically signed by:  Mariela Whitmore PTA

## 2021-10-11 ENCOUNTER — HOSPITAL ENCOUNTER (OUTPATIENT)
Dept: PHYSICAL THERAPY | Facility: CLINIC | Age: 76
Setting detail: THERAPIES SERIES
Discharge: HOME | End: 2021-10-11
Payer: MEDICARE

## 2021-10-11 PROCEDURE — 97110 THERAPEUTIC EXERCISES: CPT

## 2021-10-11 NOTE — FLOWSHEET NOTE
[] [facility]) - [facility] &  Therapy  [address]  [phone]  F: [phone] [] [address]  [address]   [address]  P: [phone]  F: [phone] [x] [address]  Therapy  [address]  P: [phone]  F: [phone] [] [address]  P: [phone]  F: [phone] [] [address]  [facility]   [address]   [place]: [phone]  F: [phone]      Physical Therapy Daily Treatment/Progress Note    Date:  10/11/2021  Patient Name:  Yasmin Agustin    :    MRN: [id number]  Physician: Dr. Garima Rojas: Kalee Finders ($0 copay, ded met, 20%)  Medical Diagnosis: Acute back pain          Rehab Codes: M54.5  Onset Date: 21                 Next 's appt.: 10/22 (Paty Petersen); MRI scheduled ; Rickey Nielsen   Visit# / total visits: ; Progress note for Medicare patient due at visit 10     Cancels/No Shows: 0/0    Subjective:    Pain:  [x] Yes  [] No Location: L LE Pain Rating: (0-10 scale) 3-4/10  Pain altered Tx:  [] No  [] Yes  Action:  Comments: no change in her status since starting PT. She had 3-4/10 \"discomfort in the leg.       Objective:  Modalities: none  Precautions: standard  Exercises:  Exercise Reps/ Time Weight/ Level completed Comments   Nustep 6' L5 X     Calf stretch 3x30\"  -- Not tolerated          Supine            Piriformis Stretch 3x30\"  X    Bridges 2x15 lime X      Wero stretch 3x30\"   X     Glut sets 10x10\"   X     Prone hip ext 2x10   X Knee bent & straight   SAQ 2x15 3 lbs X     Sidelying           Hip abd 2x15  X     Clamshells 2x15  X                 Gym           Mat Taps 2x15   --    Lunges x15 B   --     Lateral step downs 2x10  4\" -- phu   tband

## 2021-10-13 ENCOUNTER — HOSPITAL ENCOUNTER (OUTPATIENT)
Age: 76
Discharge: HOME | End: 2021-10-13
Payer: MEDICARE

## 2021-10-13 LAB
-: ABNORMAL
ALBUMIN SERPL-MCNC: 4.3 G/DL (ref 3.5–5.2)
ALBUMIN/GLOBULIN RATIO: 1.7 (ref 1–2.5)
ALP BLD-CCNC: 83 U/L (ref 35–104)
ALT SERPL-CCNC: 16 U/L (ref 5–33)
AMORPHOUS: ABNORMAL
ANION GAP SERPL CALCULATED.3IONS-SCNC: 15 MMOL/L (ref 9–17)
AST SERPL-CCNC: 17 U/L
BACTERIA: ABNORMAL
BILIRUB SERPL-MCNC: 0.62 MG/DL (ref 0.3–1.2)
BILIRUBIN URINE: NEGATIVE
BUN BLDV-MCNC: 12 MG/DL (ref 8–23)
BUN/CREAT BLD: NORMAL (ref 9–20)
CALCIUM SERPL-MCNC: 9.4 MG/DL (ref 8.6–10.4)
CASTS UA: ABNORMAL /LPF (ref 0–2)
CHLORIDE BLD-SCNC: 103 MMOL/L (ref 98–107)
CHOLESTEROL, FASTING: 237 MG/DL
CHOLESTEROL/HDL RATIO: 4
CO2: 23 MMOL/L (ref 20–31)
COLOR: YELLOW
COMMENT UA: ABNORMAL
CREAT SERPL-MCNC: 0.58 MG/DL (ref 0.5–0.9)
CRYSTALS, UA: ABNORMAL /HPF
EPITHELIAL CELLS UA: ABNORMAL /HPF (ref 0–5)
ESTIMATED AVERAGE GLUCOSE: 120 MG/DL
GFR AFRICAN AMERICAN: >60 ML/MIN
GFR NON-AFRICAN AMERICAN: >60 ML/MIN
GFR SERPL CREATININE-BSD FRML MDRD: NORMAL ML/MIN/{1.73_M2}
GFR SERPL CREATININE-BSD FRML MDRD: NORMAL ML/MIN/{1.73_M2}
GLUCOSE FASTING: 97 MG/DL (ref 70–99)
GLUCOSE URINE: NEGATIVE
HBA1C MFR BLD: 5.8 % (ref 4–6)
HDLC SERPL-MCNC: 59 MG/DL
KETONES, URINE: NEGATIVE
LDL CHOLESTEROL: 156 MG/DL (ref 0–130)
LEUKOCYTE ESTERASE, URINE: ABNORMAL
MUCUS: ABNORMAL
NITRITE, URINE: NEGATIVE
OTHER OBSERVATIONS UA: ABNORMAL
PH UA: 6.5 (ref 5–8)
POTASSIUM SERPL-SCNC: 3.8 MMOL/L (ref 3.7–5.3)
PROTEIN UA: NEGATIVE
RBC UA: ABNORMAL /HPF (ref 0–2)
RENAL EPITHELIAL, UA: ABNORMAL /HPF
SODIUM BLD-SCNC: 141 MMOL/L (ref 135–144)
SPECIFIC GRAVITY UA: 1.02 (ref 1–1.03)
TOTAL PROTEIN: 6.8 G/DL (ref 6.4–8.3)
TRICHOMONAS: ABNORMAL
TRIGLYCERIDE, FASTING: 111 MG/DL
TSH SERPL DL<=0.05 MIU/L-ACNC: 4.18 MIU/L (ref 0.3–5)
TURBIDITY: CLEAR
URINE HGB: NEGATIVE
UROBILINOGEN, URINE: NORMAL
VLDLC SERPL CALC-MCNC: ABNORMAL MG/DL (ref 1–30)
WBC UA: ABNORMAL /HPF (ref 0–5)
YEAST: ABNORMAL

## 2021-10-13 PROCEDURE — 80053 COMPREHEN METABOLIC PANEL: CPT

## 2021-10-13 PROCEDURE — 36415 COLL VENOUS BLD VENIPUNCTURE: CPT

## 2021-10-13 PROCEDURE — 80061 LIPID PANEL: CPT

## 2021-10-13 PROCEDURE — 83036 HEMOGLOBIN GLYCOSYLATED A1C: CPT

## 2021-10-13 PROCEDURE — 84443 ASSAY THYROID STIM HORMONE: CPT

## 2021-10-13 PROCEDURE — 81001 URINALYSIS AUTO W/SCOPE: CPT

## 2021-10-14 ENCOUNTER — APPOINTMENT (OUTPATIENT)
Dept: PHYSICAL THERAPY | Facility: CLINIC | Age: 76
End: 2021-10-14
Payer: MEDICARE

## 2021-10-18 ENCOUNTER — APPOINTMENT (OUTPATIENT)
Dept: PHYSICAL THERAPY | Facility: CLINIC | Age: 76
End: 2021-10-18
Payer: MEDICARE

## 2021-10-21 ENCOUNTER — APPOINTMENT (OUTPATIENT)
Dept: PHYSICAL THERAPY | Facility: CLINIC | Age: 76
End: 2021-10-21
Payer: MEDICARE

## 2021-10-25 ENCOUNTER — APPOINTMENT (OUTPATIENT)
Dept: PHYSICAL THERAPY | Facility: CLINIC | Age: 76
End: 2021-10-25
Payer: MEDICARE

## 2021-10-28 ENCOUNTER — APPOINTMENT (OUTPATIENT)
Dept: PHYSICAL THERAPY | Facility: CLINIC | Age: 76
End: 2021-10-28
Payer: MEDICARE

## 2021-11-04 ENCOUNTER — HOSPITAL ENCOUNTER (OUTPATIENT)
Dept: MRI IMAGING | Age: 76
Discharge: HOME | End: 2021-11-06
Payer: MEDICARE

## 2021-11-04 DIAGNOSIS — M54.32 LEFT SIDED SCIATICA: ICD-10-CM

## 2021-11-04 DIAGNOSIS — M54.31 RIGHT SIDED SCIATICA: ICD-10-CM

## 2021-11-04 PROCEDURE — 72148 MRI LUMBAR SPINE W/O DYE: CPT

## 2021-12-02 ENCOUNTER — HOSPITAL ENCOUNTER (OUTPATIENT)
Dept: MAMMOGRAPHY | Facility: CLINIC | Age: 76
Discharge: HOME | End: 2021-12-04
Payer: MEDICARE

## 2021-12-02 DIAGNOSIS — M81.0 SENILE OSTEOPOROSIS: ICD-10-CM

## 2021-12-02 DIAGNOSIS — M85.80 SAPHO SYNDROME (HCC): ICD-10-CM

## 2021-12-02 DIAGNOSIS — Z12.31 SCREENING MAMMOGRAM FOR HIGH-RISK PATIENT: ICD-10-CM

## 2021-12-02 DIAGNOSIS — L70.9 SAPHO SYNDROME (HCC): ICD-10-CM

## 2021-12-02 DIAGNOSIS — M86.9 SAPHO SYNDROME (HCC): ICD-10-CM

## 2021-12-02 DIAGNOSIS — L40.3 SAPHO SYNDROME (HCC): ICD-10-CM

## 2021-12-02 DIAGNOSIS — M65.9 SAPHO SYNDROME (HCC): ICD-10-CM

## 2021-12-02 PROCEDURE — 77067 SCR MAMMO BI INCL CAD: CPT

## 2021-12-02 PROCEDURE — 77080 DXA BONE DENSITY AXIAL: CPT

## 2022-01-26 ENCOUNTER — HOSPITAL ENCOUNTER (OUTPATIENT)
Dept: GENERAL RADIOLOGY | Age: 77
Discharge: HOME | End: 2022-01-28
Payer: MEDICARE

## 2022-01-26 ENCOUNTER — HOSPITAL ENCOUNTER (OUTPATIENT)
Age: 77
Discharge: HOME | End: 2022-01-28
Payer: MEDICARE

## 2022-01-26 DIAGNOSIS — M25.562 ACUTE PAIN OF LEFT KNEE: ICD-10-CM

## 2022-01-26 DIAGNOSIS — M25.569 KNEE PAIN, UNSPECIFIED CHRONICITY, UNSPECIFIED LATERALITY: ICD-10-CM

## 2022-01-26 PROCEDURE — 73565 X-RAY EXAM OF KNEES: CPT

## 2022-01-26 PROCEDURE — 73562 X-RAY EXAM OF KNEE 3: CPT

## 2022-03-16 ENCOUNTER — HOSPITAL ENCOUNTER (OUTPATIENT)
Age: 77
Discharge: HOME | End: 2022-03-16
Payer: MEDICARE

## 2022-03-16 LAB — POTASSIUM SERPL-SCNC: 5.3 MMOL/L (ref 3.7–5.3)

## 2022-03-16 PROCEDURE — 84132 ASSAY OF SERUM POTASSIUM: CPT

## 2022-03-16 PROCEDURE — 36415 COLL VENOUS BLD VENIPUNCTURE: CPT

## 2022-10-26 ENCOUNTER — HOSPITAL ENCOUNTER (OUTPATIENT)
Age: 77
Discharge: HOME | End: 2022-10-26
Payer: MEDICARE

## 2022-10-26 LAB
ALBUMIN SERPL-MCNC: 4.2 G/DL (ref 3.5–5.2)
ALBUMIN/GLOBULIN RATIO: 1.6 (ref 1–2.5)
ALP BLD-CCNC: 81 U/L (ref 35–104)
ALT SERPL-CCNC: 13 U/L (ref 5–33)
ANION GAP SERPL CALCULATED.3IONS-SCNC: 11 MMOL/L (ref 9–17)
AST SERPL-CCNC: 16 U/L
BILIRUB SERPL-MCNC: 0.5 MG/DL (ref 0.3–1.2)
BILIRUBIN URINE: NEGATIVE
BUN BLDV-MCNC: 14 MG/DL (ref 8–23)
CALCIUM SERPL-MCNC: 9.2 MG/DL (ref 8.6–10.4)
CASTS UA: NORMAL /LPF (ref 0–8)
CHLORIDE BLD-SCNC: 100 MMOL/L (ref 98–107)
CHOLESTEROL/HDL RATIO: 3.7
CHOLESTEROL: 254 MG/DL
CO2: 27 MMOL/L (ref 20–31)
COLOR: YELLOW
CREAT SERPL-MCNC: 0.63 MG/DL (ref 0.5–0.9)
EPITHELIAL CELLS UA: NORMAL /HPF (ref 0–5)
ESTIMATED AVERAGE GLUCOSE: 120 MG/DL
GFR SERPL CREATININE-BSD FRML MDRD: >60 ML/MIN/1.73M2
GLUCOSE BLD-MCNC: 90 MG/DL (ref 70–99)
GLUCOSE URINE: NEGATIVE
HBA1C MFR BLD: 5.8 % (ref 4–6)
HCT VFR BLD CALC: 41.9 % (ref 36.3–47.1)
HDLC SERPL-MCNC: 69 MG/DL
HEMOGLOBIN: 13.5 G/DL (ref 11.9–15.1)
KETONES, URINE: NEGATIVE
LDL CHOLESTEROL: 169 MG/DL (ref 0–130)
LEUKOCYTE ESTERASE, URINE: ABNORMAL
MCH RBC QN AUTO: 30.6 PG (ref 25.2–33.5)
MCHC RBC AUTO-ENTMCNC: 32.2 G/DL (ref 28.4–34.8)
MCV RBC AUTO: 95 FL (ref 82.6–102.9)
NITRITE, URINE: NEGATIVE
NRBC AUTOMATED: 0 PER 100 WBC
PDW BLD-RTO: 12.7 % (ref 11.8–14.4)
PH UA: 6.5 (ref 5–8)
PLATELET # BLD: 340 K/UL (ref 138–453)
PMV BLD AUTO: 10 FL (ref 8.1–13.5)
POTASSIUM SERPL-SCNC: 4.2 MMOL/L (ref 3.7–5.3)
PROTEIN UA: NEGATIVE
RBC # BLD: 4.41 M/UL (ref 3.95–5.11)
RBC UA: NORMAL /HPF (ref 0–4)
SODIUM BLD-SCNC: 138 MMOL/L (ref 135–144)
SPECIFIC GRAVITY UA: 1.02 (ref 1–1.03)
TOTAL PROTEIN: 6.8 G/DL (ref 6.4–8.3)
TRIGL SERPL-MCNC: 78 MG/DL
TSH SERPL DL<=0.05 MIU/L-ACNC: 4.12 UIU/ML (ref 0.3–5)
TURBIDITY: CLEAR
URINE HGB: NEGATIVE
UROBILINOGEN, URINE: NORMAL
WBC # BLD: 5 K/UL (ref 3.5–11.3)
WBC UA: NORMAL /HPF (ref 0–5)

## 2022-10-26 PROCEDURE — 81001 URINALYSIS AUTO W/SCOPE: CPT

## 2022-10-26 PROCEDURE — 36415 COLL VENOUS BLD VENIPUNCTURE: CPT

## 2022-10-26 PROCEDURE — 80053 COMPREHEN METABOLIC PANEL: CPT

## 2022-10-26 PROCEDURE — 80061 LIPID PANEL: CPT

## 2022-10-26 PROCEDURE — 85027 COMPLETE CBC AUTOMATED: CPT

## 2022-10-26 PROCEDURE — 83036 HEMOGLOBIN GLYCOSYLATED A1C: CPT

## 2022-10-26 PROCEDURE — 84443 ASSAY THYROID STIM HORMONE: CPT

## 2023-04-20 PROBLEM — I47.10 SVT (SUPRAVENTRICULAR TACHYCARDIA): Status: ACTIVE | Noted: 2023-04-20

## 2023-04-20 PROBLEM — I47.1 SVT (SUPRAVENTRICULAR TACHYCARDIA) (HCC): Status: ACTIVE | Noted: 2023-04-20

## 2023-04-20 PROBLEM — R55 SYNCOPE: Status: ACTIVE | Noted: 2023-04-20

## 2023-04-26 ENCOUNTER — HOSPITAL ENCOUNTER (OUTPATIENT)
Age: 78
Discharge: HOME | End: 2023-04-26
Payer: MEDICARE

## 2023-04-26 LAB
T3FREE SERPL-MCNC: 2.61 PG/ML (ref 2.02–4.43)
T4 FREE SERPL-MCNC: 1.3 NG/DL (ref 0.9–1.7)

## 2023-04-26 PROCEDURE — 86769 SARS-COV-2 COVID-19 ANTIBODY: CPT

## 2023-04-26 PROCEDURE — 84481 FREE ASSAY (FT-3): CPT

## 2023-04-26 PROCEDURE — 84439 ASSAY OF FREE THYROXINE: CPT

## 2023-04-26 PROCEDURE — 36415 COLL VENOUS BLD VENIPUNCTURE: CPT

## 2023-04-28 LAB — SARS-COV-2 IGG SERPLBLD QL IA.RAPID: NEGATIVE

## 2023-05-17 ENCOUNTER — HOSPITAL ENCOUNTER (OUTPATIENT)
Dept: CARDIAC CATH/INVASIVE PROCEDURES | Age: 78
Discharge: HOME | End: 2023-05-17
Payer: MEDICARE

## 2023-05-17 PROCEDURE — 93660 TILT TABLE EVALUATION: CPT

## 2023-05-17 NOTE — PROCEDURES
[address]                  [facility] [place] [address], [address]                                TILT TABLE TEST    PATIENT NAME: Kari Monet                  :        1945  MED REC NO:   [id number]                             ROOM:  ACCOUNT NO:   [de-identified]                           ADMIT DATE: 2023  PROVIDER:     Remberto Fuentes MD    Cardiovascular Diagnostics Department    PCP:  Antelmo Victor MD    HISTORY AND INDICATIONS:  This is a 28-year-old woman with stable  coronary disease and distant history of stents, with recent normal  nuclide stress test in March. She has had problems with labile blood  pressure as well as periods of lower blood pressure associated with some  mild lightheadedness and a brief syncopal episode. 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 The patient comes today  for outpatient tilt table testing. PROCEDURE:  After informed consent was obtained, the patient was brought  to the electrophysiology area in the fasting, unsedated state. She was  placed on the tilt table and IV was started. She was connected to the  continuous blood pressure cuff monitor and to the continuous heart rate  monitor. After baseline supine measurements were obtained, the table  was placed upright to 80 degrees for a period of 20 minutes, at which  point 0.4 mg sublingual nitroglycerin was given. The table was kept  upright for an additional 10 minutes, at which point testing was  completed. The table was placed supine. Findings and instruction were  discussed with the patient and she was later discharged from the area in  good condition. FINDINGS:  1. At 0 degree supine, blood pressure was 195/85, heart rate was 101,  in sinus rhythm. 2.  At 2 minutes upright, blood pressure was 193/92, heart rate was 95. The patient did note chest fluttering during sinus rhythm.   3.  At 20 minutes upright, blood

## 2023-06-07 RX ORDER — LOSARTAN POTASSIUM 25 MG/1
25 TABLET ORAL DAILY
Qty: 30 TABLET | Refills: 5 | Status: SHIPPED | OUTPATIENT
Start: 2023-06-07

## 2023-06-07 NOTE — PROGRESS NOTES
Spoke extensively with patient over phone regarding her BP recordings along with all of her questions/concerns regarding her medication allergies and symptoms. She has stopped the Citalopram that was started after tilt d/t significant HTN. She does not have any further syncope/dizziness. She is unable to tolerate Gatorade/Powerade/Pedialyte. She is tolerating increased water intake. She has increased her Atenolol to 25mg BID without improvement in BP  She has many medication allergies as listed. We will try Losartan 25mg Daily and titrate up as needed. Continue ambulatory BP monitoring and limited salt intake. Will f/u with us on Monday with further BP readings. Will need OP sleep study when she gets back into town (is in [facility] currently).

## 2023-09-08 ENCOUNTER — HOSPITAL ENCOUNTER (OUTPATIENT)
Age: 78
Discharge: HOME | End: 2023-09-08
Payer: MEDICARE

## 2023-09-08 PROCEDURE — 82306 VITAMIN D 25 HYDROXY: CPT

## 2023-09-08 PROCEDURE — 36415 COLL VENOUS BLD VENIPUNCTURE: CPT

## 2023-09-09 LAB — 25(OH)D3 SERPL-MCNC: 28.8 NG/ML

## 2023-10-27 ENCOUNTER — HOSPITAL ENCOUNTER (OUTPATIENT)
Age: 78
Discharge: HOME | End: 2023-10-27
Payer: MEDICARE

## 2023-10-27 ENCOUNTER — HOSPITAL ENCOUNTER (OUTPATIENT)
Dept: GENERAL RADIOLOGY | Age: 78
End: 2023-10-27
Payer: MEDICARE

## 2023-10-27 ENCOUNTER — HOSPITAL ENCOUNTER (OUTPATIENT)
Age: 78
End: 2023-10-27
Payer: MEDICARE

## 2023-10-27 DIAGNOSIS — G89.29 CHRONIC HAND PAIN, RIGHT: ICD-10-CM

## 2023-10-27 DIAGNOSIS — M25.511 RIGHT SHOULDER PAIN, UNSPECIFIED CHRONICITY: ICD-10-CM

## 2023-10-27 DIAGNOSIS — M25.551 RIGHT HIP PAIN: ICD-10-CM

## 2023-10-27 DIAGNOSIS — M79.641 CHRONIC HAND PAIN, RIGHT: ICD-10-CM

## 2023-10-27 PROCEDURE — 73030 X-RAY EXAM OF SHOULDER: CPT

## 2023-10-27 PROCEDURE — 73130 X-RAY EXAM OF HAND: CPT

## 2023-10-27 PROCEDURE — 36415 COLL VENOUS BLD VENIPUNCTURE: CPT

## 2023-10-27 PROCEDURE — 73502 X-RAY EXAM HIP UNI 2-3 VIEWS: CPT

## 2023-10-27 PROCEDURE — 82306 VITAMIN D 25 HYDROXY: CPT

## 2023-10-27 PROCEDURE — 80048 BASIC METABOLIC PNL TOTAL CA: CPT

## 2023-10-27 PROCEDURE — 83735 ASSAY OF MAGNESIUM: CPT

## 2023-10-28 LAB
25(OH)D3 SERPL-MCNC: 36.8 NG/ML (ref 30–100)
ANION GAP SERPL CALCULATED.3IONS-SCNC: 9 MMOL/L (ref 9–17)
BUN SERPL-MCNC: 15 MG/DL (ref 8–23)
CALCIUM SERPL-MCNC: 9.4 MG/DL (ref 8.6–10.4)
CHLORIDE SERPL-SCNC: 98 MMOL/L (ref 98–107)
CO2 SERPL-SCNC: 32 MMOL/L (ref 20–31)
CREAT SERPL-MCNC: 0.7 MG/DL (ref 0.5–0.9)
GFR SERPL CREATININE-BSD FRML MDRD: >60 ML/MIN/1.73M2
GLUCOSE SERPL-MCNC: 121 MG/DL (ref 70–99)
MAGNESIUM SERPL-MCNC: 2 MG/DL (ref 1.6–2.6)
POTASSIUM SERPL-SCNC: 3.2 MMOL/L (ref 3.7–5.3)
SODIUM SERPL-SCNC: 139 MMOL/L (ref 135–144)

## 2023-11-12 PROBLEM — E87.6 HYPOKALEMIA: Status: ACTIVE | Noted: 2023-11-12

## 2023-11-13 ENCOUNTER — HOSPITAL ENCOUNTER (OUTPATIENT)
Age: 78
Discharge: HOME | End: 2023-11-13
Payer: MEDICARE

## 2023-11-13 DIAGNOSIS — I25.10 CORONARY ARTERY DISEASE INVOLVING NATIVE CORONARY ARTERY OF NATIVE HEART WITHOUT ANGINA PECTORIS: ICD-10-CM

## 2023-11-13 LAB
ANION GAP SERPL CALCULATED.3IONS-SCNC: 8 MMOL/L (ref 9–16)
BUN SERPL-MCNC: 13 MG/DL (ref 8–23)
CALCIUM SERPL-MCNC: 9.3 MG/DL (ref 8.6–10.4)
CHLORIDE SERPL-SCNC: 104 MMOL/L (ref 98–107)
CO2 SERPL-SCNC: 30 MMOL/L (ref 20–31)
CREAT SERPL-MCNC: 0.6 MG/DL (ref 0.5–0.9)
GFR SERPL CREATININE-BSD FRML MDRD: >60 ML/MIN/1.73M2
GLUCOSE SERPL-MCNC: 96 MG/DL (ref 74–99)
POTASSIUM SERPL-SCNC: 3.8 MMOL/L (ref 3.7–5.3)
SODIUM SERPL-SCNC: 142 MMOL/L (ref 136–145)

## 2023-11-13 PROCEDURE — 36415 COLL VENOUS BLD VENIPUNCTURE: CPT

## 2023-11-13 PROCEDURE — 80048 BASIC METABOLIC PNL TOTAL CA: CPT

## 2023-11-29 ENCOUNTER — HOSPITAL ENCOUNTER (OUTPATIENT)
Age: 78
Discharge: HOME | End: 2023-11-29
Payer: MEDICARE

## 2023-11-29 DIAGNOSIS — I25.10 CORONARY ARTERY DISEASE INVOLVING NATIVE CORONARY ARTERY OF NATIVE HEART WITHOUT ANGINA PECTORIS: ICD-10-CM

## 2023-11-29 LAB
ANION GAP SERPL CALCULATED.3IONS-SCNC: 11 MMOL/L (ref 9–17)
BUN SERPL-MCNC: 15 MG/DL (ref 8–23)
CALCIUM SERPL-MCNC: 9.6 MG/DL (ref 8.6–10.4)
CHLORIDE SERPL-SCNC: 103 MMOL/L (ref 98–107)
CO2 SERPL-SCNC: 28 MMOL/L (ref 20–31)
CREAT SERPL-MCNC: 0.6 MG/DL (ref 0.5–0.9)
GFR SERPL CREATININE-BSD FRML MDRD: >60 ML/MIN/1.73M2
GLUCOSE SERPL-MCNC: 89 MG/DL (ref 70–99)
POTASSIUM SERPL-SCNC: 4.2 MMOL/L (ref 3.7–5.3)
SODIUM SERPL-SCNC: 142 MMOL/L (ref 135–144)

## 2023-11-29 PROCEDURE — 36415 COLL VENOUS BLD VENIPUNCTURE: CPT

## 2023-11-29 PROCEDURE — 80048 BASIC METABOLIC PNL TOTAL CA: CPT

## 2023-12-30 ENCOUNTER — APPOINTMENT (OUTPATIENT)
Dept: CT IMAGING | Age: 78
End: 2023-12-30
Payer: MEDICARE

## 2023-12-30 ENCOUNTER — HOSPITAL ENCOUNTER (EMERGENCY)
Age: 78
Discharge: HOME | End: 2023-12-30
Attending: EMERGENCY MEDICINE
Payer: MEDICARE

## 2023-12-30 VITALS
OXYGEN SATURATION: 94 % | TEMPERATURE: 97.8 F | RESPIRATION RATE: 15 BRPM | HEART RATE: 93 BPM | WEIGHT: 147.71 LBS | BODY MASS INDEX: 27.89 KG/M2 | DIASTOLIC BLOOD PRESSURE: 90 MMHG | HEIGHT: 61 IN | SYSTOLIC BLOOD PRESSURE: 163 MMHG

## 2023-12-30 DIAGNOSIS — R00.0 TACHYCARDIA: Primary | ICD-10-CM

## 2023-12-30 DIAGNOSIS — R00.2 PALPITATIONS: ICD-10-CM

## 2023-12-30 DIAGNOSIS — R07.9 CHEST PAIN, UNSPECIFIED TYPE: ICD-10-CM

## 2023-12-30 LAB
ANION GAP SERPL CALCULATED.3IONS-SCNC: 13 MMOL/L (ref 9–17)
BASOPHILS # BLD: 0.1 K/UL (ref 0–0.2)
BASOPHILS NFR BLD: 1 % (ref 0–2)
BNP SERPL-MCNC: 215 PG/ML
BUN SERPL-MCNC: 14 MG/DL (ref 8–23)
CALCIUM SERPL-MCNC: 9.8 MG/DL (ref 8.6–10.4)
CHLORIDE SERPL-SCNC: 103 MMOL/L (ref 98–107)
CO2 SERPL-SCNC: 26 MMOL/L (ref 20–31)
CREAT SERPL-MCNC: 0.6 MG/DL (ref 0.5–0.9)
EOSINOPHIL # BLD: 0.1 K/UL (ref 0–0.4)
EOSINOPHILS RELATIVE PERCENT: 1 % (ref 1–4)
ERYTHROCYTE [DISTWIDTH] IN BLOOD BY AUTOMATED COUNT: 13.2 % (ref 12.5–15.4)
FLUAV AG SPEC QL: NEGATIVE
FLUBV AG SPEC QL: NEGATIVE
GFR SERPL CREATININE-BSD FRML MDRD: >60 ML/MIN/1.73M2
GLUCOSE SERPL-MCNC: 101 MG/DL (ref 70–99)
HCT VFR BLD AUTO: 42.1 % (ref 36–46)
HGB BLD-MCNC: 14.5 G/DL (ref 12–16)
INR PPP: 0.9
LYMPHOCYTES NFR BLD: 1.3 K/UL (ref 1–4.8)
LYMPHOCYTES RELATIVE PERCENT: 21 % (ref 24–44)
MAGNESIUM SERPL-MCNC: 2.2 MG/DL (ref 1.6–2.6)
MCH RBC QN AUTO: 32 PG (ref 26–34)
MCHC RBC AUTO-ENTMCNC: 34.5 G/DL (ref 31–37)
MCV RBC AUTO: 92.9 FL (ref 80–100)
MONOCYTES NFR BLD: 0.7 K/UL (ref 0.1–1.2)
MONOCYTES NFR BLD: 11 % (ref 2–11)
NEUTROPHILS NFR BLD: 66 % (ref 36–66)
NEUTS SEG NFR BLD: 4.3 K/UL (ref 1.8–7.7)
PLATELET # BLD AUTO: 372 K/UL (ref 140–450)
PMV BLD AUTO: 8 FL (ref 6–12)
POTASSIUM SERPL-SCNC: 3.6 MMOL/L (ref 3.7–5.3)
PROTHROMBIN TIME: 10.1 SEC (ref 9.4–12.6)
RBC # BLD AUTO: 4.54 M/UL (ref 4–5.2)
SARS-COV-2 RDRP RESP QL NAA+PROBE: NOT DETECTED
SODIUM SERPL-SCNC: 142 MMOL/L (ref 135–144)
SPECIMEN DESCRIPTION: NORMAL
TROPONIN I SERPL HS-MCNC: 49 NG/L (ref 0–14)
TROPONIN I SERPL HS-MCNC: 67 NG/L (ref 0–14)
WBC OTHER # BLD: 6.4 K/UL (ref 3.5–11)

## 2023-12-30 PROCEDURE — 87804 INFLUENZA ASSAY W/OPTIC: CPT

## 2023-12-30 PROCEDURE — 83735 ASSAY OF MAGNESIUM: CPT

## 2023-12-30 PROCEDURE — 93005 ELECTROCARDIOGRAM TRACING: CPT

## 2023-12-30 PROCEDURE — 85025 COMPLETE CBC W/AUTO DIFF WBC: CPT

## 2023-12-30 PROCEDURE — 36415 COLL VENOUS BLD VENIPUNCTURE: CPT

## 2023-12-30 PROCEDURE — 6360000004 HC RX CONTRAST MEDICATION: Performed by: EMERGENCY MEDICINE

## 2023-12-30 PROCEDURE — 99285 EMERGENCY DEPT VISIT HI MDM: CPT

## 2023-12-30 PROCEDURE — 83880 ASSAY OF NATRIURETIC PEPTIDE: CPT

## 2023-12-30 PROCEDURE — 85610 PROTHROMBIN TIME: CPT

## 2023-12-30 PROCEDURE — 87635 SARS-COV-2 COVID-19 AMP PRB: CPT

## 2023-12-30 PROCEDURE — 2580000003 HC RX 258: Performed by: EMERGENCY MEDICINE

## 2023-12-30 PROCEDURE — 80048 BASIC METABOLIC PNL TOTAL CA: CPT

## 2023-12-30 PROCEDURE — 6370000000 HC RX 637 (ALT 250 FOR IP)

## 2023-12-30 PROCEDURE — 93005 ELECTROCARDIOGRAM TRACING: CPT | Performed by: NURSE PRACTITIONER

## 2023-12-30 PROCEDURE — 71260 CT THORAX DX C+: CPT

## 2023-12-30 PROCEDURE — 84484 ASSAY OF TROPONIN QUANT: CPT

## 2023-12-30 RX ORDER — 0.9 % SODIUM CHLORIDE 0.9 %
80 INTRAVENOUS SOLUTION INTRAVENOUS ONCE
Status: DISCONTINUED | OUTPATIENT
Start: 2023-12-30 | End: 2023-12-31 | Stop reason: HOSPADM

## 2023-12-30 RX ORDER — ASPIRIN 81 MG/1
324 TABLET, CHEWABLE ORAL ONCE
Status: COMPLETED | OUTPATIENT
Start: 2023-12-30 | End: 2023-12-30

## 2023-12-30 RX ORDER — SODIUM CHLORIDE 0.9 % (FLUSH) 0.9 %
10 SYRINGE (ML) INJECTION PRN
Status: DISCONTINUED | OUTPATIENT
Start: 2023-12-30 | End: 2023-12-31 | Stop reason: HOSPADM

## 2023-12-30 RX ADMIN — SODIUM CHLORIDE, PRESERVATIVE FREE 10 ML: 5 INJECTION INTRAVENOUS at 18:50

## 2023-12-30 RX ADMIN — Medication 80 ML: at 18:51

## 2023-12-30 RX ADMIN — ASPIRIN 81 MG CHEWABLE TABLET 324 MG: 81 TABLET CHEWABLE at 19:42

## 2023-12-30 RX ADMIN — IOPAMIDOL 75 ML: 755 INJECTION, SOLUTION INTRAVENOUS at 18:50

## 2023-12-30 ASSESSMENT — PAIN DESCRIPTION - LOCATION: LOCATION: CHEST

## 2023-12-30 ASSESSMENT — PAIN - FUNCTIONAL ASSESSMENT: PAIN_FUNCTIONAL_ASSESSMENT: 0-10

## 2023-12-30 ASSESSMENT — PAIN DESCRIPTION - DESCRIPTORS: DESCRIPTORS: TIGHTNESS

## 2023-12-30 ASSESSMENT — PAIN DESCRIPTION - PAIN TYPE: TYPE: ACUTE PAIN

## 2023-12-30 NOTE — ED PROVIDER NOTES
[facility] Emergency Department  [address]  [facility] [facility] [address]  Phone: [phone]  Fax: [phone]    Pt Name: Sruthi Sandhu  MRN: [id number]  Birthdate 1945  Date of evaluation: 12/30/23    Sruthi Sandhu is a 78 y.o. female with CC: Chest Pain (Intermittent chest pains and fast heart rates.  Onset few days ago.  Pt also describes syncope/near syncope events in the past few months. )      MDM:   78-year-old female who has a cardiac arrhythmia history (SVT) as well has CAD s/p stent placement in 2004, currently follows with both Giovanna Giordano as well as Dr. Jemal Mcodnald who presents emergency department with midsternal chest tightness and heaviness when having palpitations.  Her smart watch has states that she has gone her heart rate up into close to the 170s intermittently.  She states that she has also had lightheadedness and dizziness episodes and has been near syncopal but has not actually passed out.  She had a stress test and echo a couple months ago, both of which were normal.  It has been discussed about her having an ablation in the past, however she has never actually seen an electrophysiologist.  Initial evaluation she is resting in bed no acute distress.  Blood pressure elevated, but she does have a history of hypertension.  While resting in bed heart rate is in the low 100s.  Normal oxygen level.  On auscultation she appears to be in normal sinus rhythm with no obvious murmurs.  Clear breath sounds bilaterally.  No peripheral edema.  Plan for labs, EKG, CT of the chest.    Initial troponin elevated, repeat downtrending. No acute concerning changes on her EKG, in NSR. BNP not elevated. Very mild hypokalemia at 3.6. No anemia. CT Chest not showing any PE, PNA, pleural effusion. She had no arrhythmia or ectopy events on monitor while in the ED. I spoke to PE on call for Dr. Soriano group and reviewed case with him. Reassuring that trops are downtrending, 
User Index  [AJ] Oralia Potts APRN - CNP  [AO] Franchesca Edwards, DO        Vitals:    Vitals:    12/30/23 1409 12/30/23 1412 12/30/23 1715   BP:   (!) 182/101   Pulse:  (!) 110 94   Resp:  18 17   Temp: 97.8 °F (36.6 °C)     TempSrc: Skin     SpO2:  97% 98%   Weight:  67 kg (147 lb 11.3 oz)    Height:  1.54 m (5' 0.63\")      -------------------------  BP: (!) 182/101, Temp: 97.8 °F (36.6 °C), Pulse: 94, Respirations: 17      RE-EVALUATION:  See ED Course notes above.    DISCHARGE PLANNING:    Care resumed by Dr Edwards    This patient was seen by the attending physician and they agreed with the assessment and plan.    CONSULTS:  None    PROCEDURES:  None    FINAL IMPRESSION      1. Tachycardia    2. Chest pain, unspecified type          DISPOSITION / PLAN     CONDITION ON DISPOSITION:   Care resumed by Dr. Edwards    PATIENT REFERRED TO:  No follow-up provider specified.    DISCHARGE MEDICATIONS:  New Prescriptions    No medications on file       WOODY Flor CNP   Emergency Medicine Nurse Practitioner    (Please note that portions of this note were completed with a voice recognition program.  Efforts were made to edit the dictations but occasionally words aremis-transcribed.)       Oralia Potts APRN - CNP  12/30/23 1922

## 2024-01-01 ENCOUNTER — HOSPITAL ENCOUNTER (OUTPATIENT)
Age: 79
Setting detail: OBSERVATION
Discharge: HOME | End: 2024-01-02
Attending: EMERGENCY MEDICINE | Admitting: STUDENT IN AN ORGANIZED HEALTH CARE EDUCATION/TRAINING PROGRAM
Payer: MEDICARE

## 2024-01-01 DIAGNOSIS — I25.10 CORONARY ARTERY DISEASE INVOLVING NATIVE CORONARY ARTERY OF NATIVE HEART WITHOUT ANGINA PECTORIS: ICD-10-CM

## 2024-01-01 DIAGNOSIS — I20.0 UNSTABLE ANGINA (HCC): Primary | ICD-10-CM

## 2024-01-01 DIAGNOSIS — R00.2 PALPITATIONS: ICD-10-CM

## 2024-01-01 DIAGNOSIS — R07.9 CHEST PAIN, UNSPECIFIED TYPE: ICD-10-CM

## 2024-01-01 DIAGNOSIS — Z86.79 HISTORY OF PSVT (PAROXYSMAL SUPRAVENTRICULAR TACHYCARDIA): ICD-10-CM

## 2024-01-01 LAB
ALBUMIN SERPL-MCNC: 4.9 G/DL (ref 3.5–5.2)
ALBUMIN/GLOB SERPL: 1.6 {RATIO} (ref 1–2.5)
ALP SERPL-CCNC: 81 U/L (ref 35–104)
ALT SERPL-CCNC: 15 U/L (ref 5–33)
ANION GAP SERPL CALCULATED.3IONS-SCNC: 12 MMOL/L (ref 9–17)
AST SERPL-CCNC: 19 U/L
BASOPHILS # BLD: 0.1 K/UL (ref 0–0.2)
BASOPHILS NFR BLD: 1 % (ref 0–2)
BILIRUB SERPL-MCNC: 0.9 MG/DL (ref 0.3–1.2)
BUN SERPL-MCNC: 14 MG/DL (ref 8–23)
CALCIUM SERPL-MCNC: 9.9 MG/DL (ref 8.6–10.4)
CHLORIDE SERPL-SCNC: 102 MMOL/L (ref 98–107)
CO2 SERPL-SCNC: 26 MMOL/L (ref 20–31)
CREAT SERPL-MCNC: 0.6 MG/DL (ref 0.5–0.9)
EOSINOPHIL # BLD: 0.1 K/UL (ref 0–0.4)
EOSINOPHILS RELATIVE PERCENT: 1 % (ref 1–4)
ERYTHROCYTE [DISTWIDTH] IN BLOOD BY AUTOMATED COUNT: 13.2 % (ref 12.5–15.4)
GFR SERPL CREATININE-BSD FRML MDRD: >60 ML/MIN/1.73M2
GLUCOSE SERPL-MCNC: 115 MG/DL (ref 70–99)
HCT VFR BLD AUTO: 43.4 % (ref 36–46)
HGB BLD-MCNC: 14.9 G/DL (ref 12–16)
LYMPHOCYTES NFR BLD: 1.3 K/UL (ref 1–4.8)
LYMPHOCYTES RELATIVE PERCENT: 20 % (ref 24–44)
MCH RBC QN AUTO: 31.9 PG (ref 26–34)
MCHC RBC AUTO-ENTMCNC: 34.3 G/DL (ref 31–37)
MCV RBC AUTO: 93 FL (ref 80–100)
MONOCYTES NFR BLD: 0.5 K/UL (ref 0.1–1.2)
MONOCYTES NFR BLD: 8 % (ref 2–11)
NEUTROPHILS NFR BLD: 70 % (ref 36–66)
NEUTS SEG NFR BLD: 4.3 K/UL (ref 1.8–7.7)
PLATELET # BLD AUTO: 397 K/UL (ref 140–450)
PMV BLD AUTO: 7.6 FL (ref 6–12)
POTASSIUM SERPL-SCNC: 3.8 MMOL/L (ref 3.7–5.3)
PROT SERPL-MCNC: 8 G/DL (ref 6.4–8.3)
RBC # BLD AUTO: 4.66 M/UL (ref 4–5.2)
SODIUM SERPL-SCNC: 140 MMOL/L (ref 135–144)
TROPONIN I SERPL HS-MCNC: 15 NG/L (ref 0–14)
TROPONIN I SERPL HS-MCNC: 16 NG/L (ref 0–14)
TROPONIN I SERPL HS-MCNC: 16 NG/L (ref 0–14)
WBC OTHER # BLD: 6.3 K/UL (ref 3.5–11)

## 2024-01-01 PROCEDURE — 80053 COMPREHEN METABOLIC PANEL: CPT

## 2024-01-01 PROCEDURE — G0378 HOSPITAL OBSERVATION PER HR: HCPCS

## 2024-01-01 PROCEDURE — 84484 ASSAY OF TROPONIN QUANT: CPT

## 2024-01-01 PROCEDURE — 6370000000 HC RX 637 (ALT 250 FOR IP): Performed by: STUDENT IN AN ORGANIZED HEALTH CARE EDUCATION/TRAINING PROGRAM

## 2024-01-01 PROCEDURE — 36415 COLL VENOUS BLD VENIPUNCTURE: CPT

## 2024-01-01 PROCEDURE — 93005 ELECTROCARDIOGRAM TRACING: CPT

## 2024-01-01 PROCEDURE — 2580000003 HC RX 258: Performed by: STUDENT IN AN ORGANIZED HEALTH CARE EDUCATION/TRAINING PROGRAM

## 2024-01-01 PROCEDURE — 85025 COMPLETE CBC W/AUTO DIFF WBC: CPT

## 2024-01-01 PROCEDURE — 99285 EMERGENCY DEPT VISIT HI MDM: CPT

## 2024-01-01 RX ORDER — ONDANSETRON 4 MG/1
4 TABLET, ORALLY DISINTEGRATING ORAL EVERY 8 HOURS PRN
Status: DISCONTINUED | OUTPATIENT
Start: 2024-01-01 | End: 2024-01-02 | Stop reason: HOSPADM

## 2024-01-01 RX ORDER — ASPIRIN 81 MG/1
81 TABLET ORAL DAILY
Status: DISCONTINUED | OUTPATIENT
Start: 2024-01-02 | End: 2024-01-02 | Stop reason: HOSPADM

## 2024-01-01 RX ORDER — ENOXAPARIN SODIUM 100 MG/ML
40 INJECTION SUBCUTANEOUS DAILY
Status: DISCONTINUED | OUTPATIENT
Start: 2024-01-01 | End: 2024-01-02 | Stop reason: HOSPADM

## 2024-01-01 RX ORDER — SODIUM CHLORIDE 9 MG/ML
INJECTION, SOLUTION INTRAVENOUS PRN
Status: DISCONTINUED | OUTPATIENT
Start: 2024-01-01 | End: 2024-01-02 | Stop reason: HOSPADM

## 2024-01-01 RX ORDER — ATENOLOL 25 MG/1
12.5 TABLET ORAL 2 TIMES DAILY
Status: DISCONTINUED | OUTPATIENT
Start: 2024-01-01 | End: 2024-01-02 | Stop reason: HOSPADM

## 2024-01-01 RX ORDER — ACETAMINOPHEN 325 MG/1
650 TABLET ORAL EVERY 6 HOURS PRN
Status: DISCONTINUED | OUTPATIENT
Start: 2024-01-01 | End: 2024-01-02 | Stop reason: HOSPADM

## 2024-01-01 RX ORDER — ONDANSETRON 2 MG/ML
4 INJECTION INTRAMUSCULAR; INTRAVENOUS EVERY 6 HOURS PRN
Status: DISCONTINUED | OUTPATIENT
Start: 2024-01-01 | End: 2024-01-02 | Stop reason: HOSPADM

## 2024-01-01 RX ORDER — POLYETHYLENE GLYCOL 3350 17 G/17G
17 POWDER, FOR SOLUTION ORAL DAILY PRN
Status: DISCONTINUED | OUTPATIENT
Start: 2024-01-01 | End: 2024-01-02 | Stop reason: HOSPADM

## 2024-01-01 RX ORDER — MAGNESIUM SULFATE IN WATER 40 MG/ML
2000 INJECTION, SOLUTION INTRAVENOUS PRN
Status: DISCONTINUED | OUTPATIENT
Start: 2024-01-01 | End: 2024-01-02 | Stop reason: HOSPADM

## 2024-01-01 RX ORDER — LOSARTAN POTASSIUM 50 MG/1
50 TABLET ORAL DAILY
Status: DISCONTINUED | OUTPATIENT
Start: 2024-01-01 | End: 2024-01-02 | Stop reason: HOSPADM

## 2024-01-01 RX ORDER — SODIUM CHLORIDE 0.9 % (FLUSH) 0.9 %
5-40 SYRINGE (ML) INJECTION PRN
Status: DISCONTINUED | OUTPATIENT
Start: 2024-01-01 | End: 2024-01-02 | Stop reason: HOSPADM

## 2024-01-01 RX ORDER — HYDROCHLOROTHIAZIDE 25 MG/1
12.5 TABLET ORAL DAILY
Status: DISCONTINUED | OUTPATIENT
Start: 2024-01-01 | End: 2024-01-02 | Stop reason: HOSPADM

## 2024-01-01 RX ORDER — LOSARTAN POTASSIUM AND HYDROCHLOROTHIAZIDE 12.5; 5 MG/1; MG/1
1 TABLET ORAL DAILY
Status: DISCONTINUED | OUTPATIENT
Start: 2024-01-01 | End: 2024-01-01

## 2024-01-01 RX ORDER — ACETAMINOPHEN 650 MG/1
650 SUPPOSITORY RECTAL EVERY 6 HOURS PRN
Status: DISCONTINUED | OUTPATIENT
Start: 2024-01-01 | End: 2024-01-02 | Stop reason: HOSPADM

## 2024-01-01 RX ORDER — POTASSIUM CHLORIDE 7.45 MG/ML
10 INJECTION INTRAVENOUS PRN
Status: DISCONTINUED | OUTPATIENT
Start: 2024-01-01 | End: 2024-01-02 | Stop reason: HOSPADM

## 2024-01-01 RX ORDER — POTASSIUM CHLORIDE 20 MEQ/1
40 TABLET, EXTENDED RELEASE ORAL PRN
Status: DISCONTINUED | OUTPATIENT
Start: 2024-01-01 | End: 2024-01-02 | Stop reason: HOSPADM

## 2024-01-01 RX ORDER — SODIUM CHLORIDE 0.9 % (FLUSH) 0.9 %
5-40 SYRINGE (ML) INJECTION EVERY 12 HOURS SCHEDULED
Status: DISCONTINUED | OUTPATIENT
Start: 2024-01-01 | End: 2024-01-02 | Stop reason: HOSPADM

## 2024-01-01 RX ADMIN — LOSARTAN POTASSIUM 50 MG: 50 TABLET, FILM COATED ORAL at 23:34

## 2024-01-01 RX ADMIN — SODIUM CHLORIDE, PRESERVATIVE FREE 10 ML: 5 INJECTION INTRAVENOUS at 23:34

## 2024-01-01 RX ADMIN — ATENOLOL 12.5 MG: 25 TABLET ORAL at 23:34

## 2024-01-01 RX ADMIN — HYDROCHLOROTHIAZIDE 12.5 MG: 25 TABLET ORAL at 23:33

## 2024-01-01 ASSESSMENT — PAIN DESCRIPTION - PAIN TYPE
TYPE: ACUTE PAIN
TYPE: ACUTE PAIN

## 2024-01-01 ASSESSMENT — PAIN DESCRIPTION - LOCATION
LOCATION: CHEST

## 2024-01-01 ASSESSMENT — PAIN SCALES - GENERAL
PAINLEVEL_OUTOF10: 4
PAINLEVEL_OUTOF10: 2
PAINLEVEL_OUTOF10: 4

## 2024-01-01 ASSESSMENT — PAIN - FUNCTIONAL ASSESSMENT: PAIN_FUNCTIONAL_ASSESSMENT: 0-10

## 2024-01-01 ASSESSMENT — PAIN DESCRIPTION - DESCRIPTORS: DESCRIPTORS: TIGHTNESS

## 2024-01-01 NOTE — ED NOTES
Pt. States that chest tightness episodes have gradually become more frequent and lasting longer (as long as 17 min).  Pt. States she has had a couple recent times where she has also has associated SOB and 2x left ear pain down into her left arm.

## 2024-01-01 NOTE — ED PROVIDER NOTES
[address]  Emergency Department Encounter  Mid Level Provider     Pt Name: Sruthi Sandhu  MRN: [id number]  Birthdate 1945  Date of evaluation: 1/1/24  PCP:  Meg Valero MD    CHIEF COMPLAINT       Chief Complaint   Patient presents with    Chest Pain     Chest tightness and shortness of breath.  Intermittent symptoms for several days. Recent admit to ER/Hospital for same.        HISTORY OF PRESENT ILLNESS  (Location/Symptom, Timing/Onset,Context/Setting, Quality, Duration, Modifying Factors, Severity.)      Sruthi Sandhu is a 78 y.o. female who presents with complaints of chest tightness, shortness of breath, rapid heart rate that began this afternoon.  She reports that she had a similar episode 1 to 2 weeks ago while in [place].  She has been in contact with her cardiologist who has recommended that she undergo a 30-day Holter monitor test however has been unable to obtain one secondary to holiday scheduling.  She reports she had similar episode on Saturday and had a workup that included serial troponins, CT of the chest to rule out pulmonary embolus, and was ultimately discharged home with instructions for outpatient follow up. She reports that this afternoon the symptoms returned which is what prompted her to return to the department.     PAST MEDICAL /SURGICAL / SOCIAL / FAMILY HISTORY      has a past medical history of Acute dermatitis, Coronary atherosclerosis of native coronary artery, Hyperlipemia, Hypertension, and Metabolic syndrome.     has a past surgical history that includes Coronary angioplasty with stent (2004) and Cataract removal.    Social History     Socioeconomic History    Marital status: Single     Spouse name: Not on file    Number of children: Not on file    Years of education: Not on file    Highest education level: Not on file   Occupational History    Not on file   Tobacco Use    Smoking status: Never    Smokeless tobacco: Never   Substance and Sexual  pulmonary abnormality. 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  The remaining branches of the celiac artery appear patent where visualized. Coronary atherosclerosis.       No orders to display       Labs:  Results for orders placed or performed during the hospital encounter of 01/01/24   CBC with Auto Differential   Result Value Ref Range    WBC 6.3 3.5 - 11.0 k/uL    RBC 4.66 4.0 - 5.2 m/uL    Hemoglobin 14.9 12.0 - 16.0 g/dL    Hematocrit 43.4 36 - 46 %    MCV 93.0 80 - 100 fL    MCH 31.9 26 - 34 pg    MCHC 34.3 31 - 37 g/dL    RDW 13.2 12.5 - 15.4 %    Platelets 397 140 - 450 k/uL    MPV 7.6 6.0 - 12.0 fL    Neutrophils % 70 (H) 36 - 66 %    Lymphocytes % 20 (L) 24 - 44 %    Monocytes % 8 2 - 11 %    Eosinophils % 1 1 - 4 %    Basophils % 1 0 - 2 %    Neutrophils Absolute 4.30 1.8 - 7.7 k/uL    Lymphocytes Absolute 1.30 1.0 - 4.8 k/uL    Monocytes Absolute 0.50 0.1 - 1.2 k/uL    Eosinophils Absolute 0.10 0.0 - 0.4 k/uL    Basophils Absolute 0.10 0.0 - 0.2 k/uL   CMP   Result Value Ref Range    Sodium 140 135 - 144 mmol/L    Potassium 3.8 3.7 - 5.3 mmol/L    Chloride 102 98 - 107 mmol/L    CO2 26 20 - 31 mmol/L    Anion Gap 12 9 - 17 mmol/L    Glucose 115 (H) 70 - 99 mg/dL    BUN 14 8 - 23 mg/dL    Creatinine 0.6 0.5 - 0.9 mg/dL    Est, Glom Filt Rate >60 >60 mL/min/1.73m2    Calcium 9.9 8.6 - 10.4 mg/dL    Total Protein 8.0 6.4 - 8.3 g/dL    Albumin 4.9 3.5 - 5.2 g/dL    Albumin/Globulin Ratio 1.6 1.0 - 2.5    Total Bilirubin 0.9 0.3 - 1.2 mg/dL    Alkaline Phosphatase 81 35 - 104 U/L    ALT 15 5 - 33 U/L    AST 19 <32 U/L   Troponin   Result Value Ref Range    Troponin, High Sensitivity 16 (H) 0 - 14 ng/L   Troponin   Result Value Ref Range    Troponin, High Sensitivity 15 (H) 0 - 14 ng/L   Troponin   Result Value Ref Range    Troponin, High Sensitivity 16 (H) 0 - 14 ng/L   Troponin   Result Value Ref Range    Troponin, High Sensitivity 16 (H) 0 - 14 ng/L   Basic Metabolic Panel w/

## 2024-01-02 ENCOUNTER — APPOINTMENT (OUTPATIENT)
Age: 79
End: 2024-01-02
Payer: MEDICARE

## 2024-01-02 ENCOUNTER — APPOINTMENT (OUTPATIENT)
Dept: NUCLEAR MEDICINE | Age: 79
End: 2024-01-02
Payer: MEDICARE

## 2024-01-02 ENCOUNTER — APPOINTMENT (OUTPATIENT)
Age: 79
End: 2024-01-02
Attending: STUDENT IN AN ORGANIZED HEALTH CARE EDUCATION/TRAINING PROGRAM
Payer: MEDICARE

## 2024-01-02 VITALS
HEART RATE: 77 BPM | OXYGEN SATURATION: 96 % | SYSTOLIC BLOOD PRESSURE: 136 MMHG | WEIGHT: 147 LBS | HEIGHT: 60 IN | BODY MASS INDEX: 28.86 KG/M2 | TEMPERATURE: 97.9 F | DIASTOLIC BLOOD PRESSURE: 77 MMHG | RESPIRATION RATE: 16 BRPM

## 2024-01-02 PROBLEM — R00.2 PALPITATIONS: Status: ACTIVE | Noted: 2024-01-02

## 2024-01-02 LAB
ANION GAP SERPL CALCULATED.3IONS-SCNC: 13 MMOL/L (ref 9–17)
BUN SERPL-MCNC: 12 MG/DL (ref 8–23)
CALCIUM SERPL-MCNC: 9.9 MG/DL (ref 8.6–10.4)
CHLORIDE SERPL-SCNC: 105 MMOL/L (ref 98–107)
CO2 SERPL-SCNC: 25 MMOL/L (ref 20–31)
CREAT SERPL-MCNC: 0.6 MG/DL (ref 0.5–0.9)
ECHO AO ROOT DIAM: 3.1 CM
ECHO AO ROOT INDEX: 1.89 CM/M2
ECHO AV AREA PEAK VELOCITY: 2.8 CM2
ECHO AV AREA VTI: 2.9 CM2
ECHO AV AREA/BSA PEAK VELOCITY: 1.7 CM2/M2
ECHO AV AREA/BSA VTI: 1.8 CM2/M2
ECHO AV MEAN GRADIENT: 3 MMHG
ECHO AV MEAN VELOCITY: 0.9 M/S
ECHO AV PEAK GRADIENT: 6 MMHG
ECHO AV PEAK VELOCITY: 1.2 M/S
ECHO AV VELOCITY RATIO: 0.92
ECHO AV VTI: 24.8 CM
ECHO BSA: 1.68 M2
ECHO BSA: 1.68 M2
ECHO IVC EXP: 1.7 CM
ECHO IVC INSP: 0.8 CM
ECHO LA AREA 2C: 11.8 CM2
ECHO LA AREA 4C: 14.5 CM2
ECHO LA DIAMETER INDEX: 2.32 CM/M2
ECHO LA DIAMETER: 3.8 CM
ECHO LA MAJOR AXIS: 4.7 CM
ECHO LA MINOR AXIS: 4.4 CM
ECHO LA TO AORTIC ROOT RATIO: 1.23
ECHO LA VOL BP: 31 ML (ref 22–52)
ECHO LA VOL MOD A2C: 26 ML (ref 22–52)
ECHO LA VOL MOD A4C: 35 ML (ref 22–52)
ECHO LA VOL/BSA BIPLANE: 19 ML/M2 (ref 16–34)
ECHO LA VOLUME INDEX MOD A2C: 16 ML/M2 (ref 16–34)
ECHO LA VOLUME INDEX MOD A4C: 21 ML/M2 (ref 16–34)
ECHO LV E' LATERAL VELOCITY: 9 CM/S
ECHO LV E' SEPTAL VELOCITY: 7 CM/S
ECHO LV FRACTIONAL SHORTENING: 37 % (ref 28–44)
ECHO LV INTERNAL DIMENSION DIASTOLE INDEX: 2.32 CM/M2
ECHO LV INTERNAL DIMENSION DIASTOLIC: 3.8 CM (ref 3.9–5.3)
ECHO LV INTERNAL DIMENSION SYSTOLIC INDEX: 1.46 CM/M2
ECHO LV INTERNAL DIMENSION SYSTOLIC: 2.4 CM
ECHO LV IVSD: 0.9 CM (ref 0.6–0.9)
ECHO LV MASS 2D: 101.1 G (ref 67–162)
ECHO LV MASS INDEX 2D: 61.6 G/M2 (ref 43–95)
ECHO LV POSTERIOR WALL DIASTOLIC: 0.9 CM (ref 0.6–0.9)
ECHO LV RELATIVE WALL THICKNESS RATIO: 0.47
ECHO LVOT AREA: 3.1 CM2
ECHO LVOT AV VTI INDEX: 0.91
ECHO LVOT DIAM: 2 CM
ECHO LVOT MEAN GRADIENT: 3 MMHG
ECHO LVOT PEAK GRADIENT: 4 MMHG
ECHO LVOT PEAK VELOCITY: 1.1 M/S
ECHO LVOT STROKE VOLUME INDEX: 43.1 ML/M2
ECHO LVOT SV: 70.7 ML
ECHO LVOT VTI: 22.5 CM
ECHO MV A VELOCITY: 0.68 M/S
ECHO MV E DECELERATION TIME (DT): 165 MS
ECHO MV E VELOCITY: 0.47 M/S
ECHO MV E/A RATIO: 0.69
ECHO MV E/E' LATERAL: 5.22
ECHO MV E/E' RATIO (AVERAGED): 5.97
ECHO RV BASAL DIMENSION: 2.9 CM
ECHO RV FREE WALL PEAK S': 9 CM/S
EKG ATRIAL RATE: 110 BPM
EKG ATRIAL RATE: 93 BPM
EKG P AXIS: 46 DEGREES
EKG P AXIS: 46 DEGREES
EKG P-R INTERVAL: 182 MS
EKG P-R INTERVAL: 202 MS
EKG Q-T INTERVAL: 350 MS
EKG Q-T INTERVAL: 390 MS
EKG QRS DURATION: 92 MS
EKG QRS DURATION: 92 MS
EKG QTC CALCULATION (BAZETT): 473 MS
EKG QTC CALCULATION (BAZETT): 484 MS
EKG R AXIS: -12 DEGREES
EKG R AXIS: -19 DEGREES
EKG T AXIS: 21 DEGREES
EKG T AXIS: 77 DEGREES
EKG VENTRICULAR RATE: 110 BPM
EKG VENTRICULAR RATE: 93 BPM
GFR SERPL CREATININE-BSD FRML MDRD: >60 ML/MIN/1.73M2
GLUCOSE SERPL-MCNC: 108 MG/DL (ref 70–99)
POTASSIUM SERPL-SCNC: 3.7 MMOL/L (ref 3.7–5.3)
SODIUM SERPL-SCNC: 143 MMOL/L (ref 135–144)
STRESS BASELINE DIAS BP: 79 MMHG
STRESS BASELINE HR: 73 BPM
STRESS BASELINE SYS BP: 148 MMHG
STRESS ESTIMATED WORKLOAD: 1 METS
STRESS PEAK DIAS BP: 80 MMHG
STRESS PEAK SYS BP: 158 MMHG
STRESS PERCENT HR ACHIEVED: 72 %
STRESS POST PEAK HR: 104 BPM
STRESS RATE PRESSURE PRODUCT: NORMAL BPM*MMHG
STRESS ST DEPRESSION: 0.8 MM
STRESS TARGET HR: 144 BPM
TROPONIN I SERPL HS-MCNC: 16 NG/L (ref 0–14)

## 2024-01-02 PROCEDURE — 99223 1ST HOSP IP/OBS HIGH 75: CPT | Performed by: NURSE PRACTITIONER

## 2024-01-02 PROCEDURE — A9500 TC99M SESTAMIBI: HCPCS | Performed by: STUDENT IN AN ORGANIZED HEALTH CARE EDUCATION/TRAINING PROGRAM

## 2024-01-02 PROCEDURE — 2580000003 HC RX 258: Performed by: STUDENT IN AN ORGANIZED HEALTH CARE EDUCATION/TRAINING PROGRAM

## 2024-01-02 PROCEDURE — 99238 HOSP IP/OBS DSCHRG MGMT 30/<: CPT | Performed by: STUDENT IN AN ORGANIZED HEALTH CARE EDUCATION/TRAINING PROGRAM

## 2024-01-02 PROCEDURE — 93018 CV STRESS TEST I&R ONLY: CPT | Performed by: INTERNAL MEDICINE

## 2024-01-02 PROCEDURE — 3430000000 HC RX DIAGNOSTIC RADIOPHARMACEUTICAL: Performed by: STUDENT IN AN ORGANIZED HEALTH CARE EDUCATION/TRAINING PROGRAM

## 2024-01-02 PROCEDURE — 80048 BASIC METABOLIC PNL TOTAL CA: CPT

## 2024-01-02 PROCEDURE — 93306 TTE W/DOPPLER COMPLETE: CPT | Performed by: INTERNAL MEDICINE

## 2024-01-02 PROCEDURE — 78452 HT MUSCLE IMAGE SPECT MULT: CPT

## 2024-01-02 PROCEDURE — 6370000000 HC RX 637 (ALT 250 FOR IP): Performed by: STUDENT IN AN ORGANIZED HEALTH CARE EDUCATION/TRAINING PROGRAM

## 2024-01-02 PROCEDURE — 93306 TTE W/DOPPLER COMPLETE: CPT

## 2024-01-02 PROCEDURE — 93017 CV STRESS TEST TRACING ONLY: CPT

## 2024-01-02 PROCEDURE — 6360000002 HC RX W HCPCS: Performed by: STUDENT IN AN ORGANIZED HEALTH CARE EDUCATION/TRAINING PROGRAM

## 2024-01-02 PROCEDURE — G0378 HOSPITAL OBSERVATION PER HR: HCPCS

## 2024-01-02 PROCEDURE — 36415 COLL VENOUS BLD VENIPUNCTURE: CPT

## 2024-01-02 RX ORDER — ATROPINE SULFATE 0.1 MG/ML
0.5 INJECTION INTRAVENOUS EVERY 5 MIN PRN
Status: CANCELLED | OUTPATIENT
Start: 2024-01-02 | End: 2024-01-02

## 2024-01-02 RX ORDER — TETRAKIS(2-METHOXYISOBUTYLISOCYANIDE)COPPER(I) TETRAFLUOROBORATE 1 MG/ML
30 INJECTION, POWDER, LYOPHILIZED, FOR SOLUTION INTRAVENOUS
Status: COMPLETED | OUTPATIENT
Start: 2024-01-02 | End: 2024-01-02

## 2024-01-02 RX ORDER — ALBUTEROL SULFATE 90 UG/1
2 AEROSOL, METERED RESPIRATORY (INHALATION) PRN
Status: CANCELLED | OUTPATIENT
Start: 2024-01-02 | End: 2024-01-02

## 2024-01-02 RX ORDER — SODIUM CHLORIDE 9 MG/ML
500 INJECTION, SOLUTION INTRAVENOUS CONTINUOUS PRN
Status: CANCELLED | OUTPATIENT
Start: 2024-01-02 | End: 2024-01-02

## 2024-01-02 RX ORDER — AMINOPHYLLINE 25 MG/ML
50 INJECTION, SOLUTION INTRAVENOUS PRN
Status: CANCELLED | OUTPATIENT
Start: 2024-01-02 | End: 2024-01-02

## 2024-01-02 RX ORDER — AMINOPHYLLINE 25 MG/ML
50 INJECTION, SOLUTION INTRAVENOUS PRN
Status: DISCONTINUED | OUTPATIENT
Start: 2024-01-02 | End: 2024-01-02

## 2024-01-02 RX ORDER — SODIUM CHLORIDE 0.9 % (FLUSH) 0.9 %
5-40 SYRINGE (ML) INJECTION PRN
Status: DISCONTINUED | OUTPATIENT
Start: 2024-01-02 | End: 2024-01-02

## 2024-01-02 RX ORDER — REGADENOSON 0.08 MG/ML
0.4 INJECTION, SOLUTION INTRAVENOUS
Status: CANCELLED | OUTPATIENT
Start: 2024-01-02

## 2024-01-02 RX ORDER — SODIUM CHLORIDE 0.9 % (FLUSH) 0.9 %
5-40 SYRINGE (ML) INJECTION PRN
Status: CANCELLED | OUTPATIENT
Start: 2024-01-02 | End: 2024-01-02

## 2024-01-02 RX ORDER — METOPROLOL TARTRATE 1 MG/ML
5 INJECTION, SOLUTION INTRAVENOUS EVERY 5 MIN PRN
Status: CANCELLED | OUTPATIENT
Start: 2024-01-02 | End: 2024-01-02

## 2024-01-02 RX ORDER — REGADENOSON 0.08 MG/ML
0.4 INJECTION, SOLUTION INTRAVENOUS
Status: COMPLETED | OUTPATIENT
Start: 2024-01-02 | End: 2024-01-02

## 2024-01-02 RX ORDER — SODIUM CHLORIDE 0.9 % (FLUSH) 0.9 %
10 SYRINGE (ML) INJECTION
Status: DISCONTINUED | OUTPATIENT
Start: 2024-01-02 | End: 2024-01-02 | Stop reason: HOSPADM

## 2024-01-02 RX ORDER — NITROGLYCERIN 0.4 MG/1
0.4 TABLET SUBLINGUAL EVERY 5 MIN PRN
Status: CANCELLED | OUTPATIENT
Start: 2024-01-02 | End: 2024-01-02

## 2024-01-02 RX ORDER — NITROGLYCERIN 0.4 MG/1
0.4 TABLET SUBLINGUAL EVERY 5 MIN PRN
Status: DISCONTINUED | OUTPATIENT
Start: 2024-01-02 | End: 2024-01-02

## 2024-01-02 RX ORDER — ATROPINE SULFATE 0.1 MG/ML
0.5 INJECTION INTRAVENOUS EVERY 5 MIN PRN
Status: DISCONTINUED | OUTPATIENT
Start: 2024-01-02 | End: 2024-01-02

## 2024-01-02 RX ORDER — TETRAKIS(2-METHOXYISOBUTYLISOCYANIDE)COPPER(I) TETRAFLUOROBORATE 1 MG/ML
10 INJECTION, POWDER, LYOPHILIZED, FOR SOLUTION INTRAVENOUS
Status: COMPLETED | OUTPATIENT
Start: 2024-01-02 | End: 2024-01-02

## 2024-01-02 RX ORDER — SODIUM CHLORIDE 9 MG/ML
500 INJECTION, SOLUTION INTRAVENOUS CONTINUOUS PRN
Status: DISCONTINUED | OUTPATIENT
Start: 2024-01-02 | End: 2024-01-02

## 2024-01-02 RX ORDER — ALBUTEROL SULFATE 90 UG/1
2 AEROSOL, METERED RESPIRATORY (INHALATION) PRN
Status: DISCONTINUED | OUTPATIENT
Start: 2024-01-02 | End: 2024-01-02

## 2024-01-02 RX ADMIN — HYDROCHLOROTHIAZIDE 12.5 MG: 25 TABLET ORAL at 11:48

## 2024-01-02 RX ADMIN — REGADENOSON 0.4 MG: 0.08 INJECTION, SOLUTION INTRAVENOUS at 09:37

## 2024-01-02 RX ADMIN — ASPIRIN 81 MG: 81 TABLET, COATED ORAL at 11:47

## 2024-01-02 RX ADMIN — TETRAKIS(2-METHOXYISOBUTYLISOCYANIDE)COPPER(I) TETRAFLUOROBORATE 34.6 MILLICURIE: 1 INJECTION, POWDER, LYOPHILIZED, FOR SOLUTION INTRAVENOUS at 09:43

## 2024-01-02 RX ADMIN — SODIUM CHLORIDE, PRESERVATIVE FREE 10 ML: 5 INJECTION INTRAVENOUS at 09:34

## 2024-01-02 RX ADMIN — SODIUM CHLORIDE, PRESERVATIVE FREE 10 ML: 5 INJECTION INTRAVENOUS at 11:53

## 2024-01-02 RX ADMIN — ATENOLOL 12.5 MG: 25 TABLET ORAL at 11:48

## 2024-01-02 RX ADMIN — LOSARTAN POTASSIUM 50 MG: 50 TABLET, FILM COATED ORAL at 11:48

## 2024-01-02 RX ADMIN — TETRAKIS(2-METHOXYISOBUTYLISOCYANIDE)COPPER(I) TETRAFLUOROBORATE 12.1 MILLICURIE: 1 INJECTION, POWDER, LYOPHILIZED, FOR SOLUTION INTRAVENOUS at 08:12

## 2024-01-02 ASSESSMENT — PAIN SCALES - GENERAL: PAINLEVEL_OUTOF10: 0

## 2024-01-02 NOTE — PLAN OF CARE
Problem: Discharge Planning  Goal: Discharge to home or other facility with appropriate resources  1/2/2024 1527 by Juan A Campo, RN  Outcome: Progressing  Flowsheets  Taken 1/2/2024 1200  Discharge to home or other facility with appropriate resources:   Identify barriers to discharge with patient and caregiver   Arrange for needed discharge resources and transportation as appropriate   Identify discharge learning needs (meds, wound care, etc)  Taken 1/2/2024 0800  Discharge to home or other facility with appropriate resources:   Identify barriers to discharge with patient and caregiver   Arrange for needed discharge resources and transportation as appropriate   Identify discharge learning needs (meds, wound care, etc)     Problem: Pain  Goal: Verbalizes/displays adequate comfort level or baseline comfort level  1/2/2024 1527 by Juan A Campo, RN  Outcome: Progressing  Flowsheets (Taken 1/2/2024 0814)  Verbalizes/displays adequate comfort level or baseline comfort level:   Encourage patient to monitor pain and request assistance   Assess pain using appropriate pain scale   Administer analgesics based on type and severity of pain and evaluate response   Implement non-pharmacological measures as appropriate and evaluate response   Consider cultural and social influences on pain and pain management   Notify Licensed Independent Practitioner if interventions unsuccessful or patient reports new pain     Problem: Safety - Adult  Goal: Free from fall injury  1/2/2024 1527 by Juan A Campo, RN  Outcome: Progressing     Problem: ABCDS Injury Assessment  Goal: Absence of physical injury  1/2/2024 1527 by Juan A Campo, RN  Outcome: Progressing     Problem: Cardiovascular - Adult  Goal: Maintains optimal cardiac output and hemodynamic stability  1/2/2024 1527 by Juan A Campo, RN  Outcome: Progressing  Flowsheets  Taken 1/2/2024 1200  Maintains optimal cardiac output and hemodynamic stability:   Monitor blood pressure and heart  rate   Monitor urine output and notify Licensed Independent Practitioner for values outside of normal range   Assess for signs of decreased cardiac output  Taken 1/2/2024 0800  Maintains optimal cardiac output and hemodynamic stability:   Monitor blood pressure and heart rate   Monitor urine output and notify Licensed Independent Practitioner for values outside of normal range   Assess for signs of decreased cardiac output     Problem: Cardiovascular - Adult  Goal: Absence of cardiac dysrhythmias or at baseline  1/2/2024 1527 by Juan A Campo, RN  Outcome: Progressing  Flowsheets  Taken 1/2/2024 1200  Absence of cardiac dysrhythmias or at baseline: Monitor cardiac rate and rhythm  Taken 1/2/2024 0800  Absence of cardiac dysrhythmias or at baseline: Monitor cardiac rate and rhythm

## 2024-01-02 NOTE — DISCHARGE SUMMARY
[facility]  Office: [phone]  Michael Bravo DO, Jules Jeffries DO, Yoel Jackson DO, Harvey Garcia DO, Omer Vallejo MD, Randa Mireles MD, Mary Gill MD, Lynette Otto MD,  Erasmo Dowd MD, Fidelina Gonzales MD, Anita Angelo MD,  Karli Shore DO, Andreea Stover MD, Naga Giron MD, Hamilton Bravo DO, Ambreen Reid MD,  Ever Stevens DO, Paulina Martinez MD, Michelle Estrada MD, Magdalene Miner MD, Aldair Appiah MD,  José Antonio Eckert MD, Jo Ann Pyle MD, Trent Cochran MD, Nelson Duque MD, Ap Diehl MD, Lillie Pelletier MD, Florencio Dorman DO, Caleb Prakash DO, Melisa Diaz MD,  Stephan Rodríguez MD, Shirley Waterhouse, CNP,  Kay Ricardo, CNP, Anatoly Madrigal, CNP,  Rosina Orellana, EDWIN, Bhavna Andrade, CNP, Brianna Lopez, CNP, Linda Chirinos CNP, Mercedes Ford, CNP, Angy Enrique, CNP, Luzmaria Moreland, PA-C, Mayda Jones, PA-C, Chrystal Marie, CNP, Deepika Bradshaw, CNS, Cielo Licea, CNP, Kelly Espino, CNP, Tracy Schwab, CNP         [facility]   IN-PATIENT SERVICE   [facility]    Discharge Summary     Patient ID: Sruthi Sandhu  :  1945   MRN: [id number]     ACCOUNT:  [id number]   Patient's PCP: Meg Valero MD  Admit Date: 2024   Discharge Date: 2024  Length of Stay: 0  Code Status:  DNR-CCA  Admitting Physician: Naga Giron MD  Discharge Physician: Naga Giron MD     Active Discharge Diagnoses:     Hospital Problem Lists:  Principal Problem:    Unstable angina (HCC)  Active Problems:    Essential hypertension    Hyperlipidemia    History of PSVT (paroxysmal supraventricular tachycardia)    Palpitations  Resolved Problems:    * No resolved hospital problems. *      Admission Condition:  good     Discharged Condition: good    Hospital Stay:     Hospital Course:  Sruthi Sandhu is a 78 y.o. female with a past medical history of CAD (s/p stent placement in ), paroxsymal supraventricular tachycardia, hypertension and

## 2024-01-02 NOTE — H&P
[facility]  Office: [phone]  Michael Bravo DO, Jules Jeffries DO, Yoel Jackson DO, Harvey Garcia DO, Omer Vallejo MD, Randa Mireles MD, Mary Gill MD, Lynette Otto MD,  Erasmo Dowd MD, Fidelina Gonzales MD, Anita Angelo MD,  Karli hSore DO, Andreea Stover MD, Naga Giron MD, Hamilton Bravo DO, Ambreen Reid MD,  Ever Stevens DO, Paulina Martinez MD, Michelle Estrada MD, Magdalene Miner MD, Aldair Appiah MD,  José Antonio Eckert MD, Jo Ann Pyle MD, Trent Cochran MD, Nelson Duque MD, Ap Diehl MD, Lillie Pelletier MD, Florencio Dorman DO, Caleb Prakash DO, Melisa Diaz MD,  Stephan Rodríguez MD, Shirley Waterhouse, CNP,  Kay Ricardo, CNP, Anatoly Madrigal, CNP,  Rosina Orellana, DNP, Bhavna Andrade, CNP, Brianna Lopez, CNP, Linda Chirinos CNP, Mercedes Ford, CNP, Angy Enrique, CNP, Luzmaria Moreland, PA-C, Mayda Jones PA-C, Chrystal Marie, CNP, Deepika Bradshaw, CNS, Cielo Licea, CNP, Kelly Espino, CNP, Tracy Schwab, CNP         [facility]   IN-PATIENT SERVICE   [facility]    HISTORY AND PHYSICAL EXAMINATION            Date:   1/2/2024  Patient name:  Sruthi Sandhu  Date of admission:  1/1/2024  2:33 PM  MRN:   [id number]  Account:  [id number]  YOB: 1945  PCP:    Meg Valero MD  Room:   331/331-01  Code Status:    DNR-CCA      History Obtained From:     patient    History of Present Illness:     Sruthi Sandhu is a 78 y.o. female with a past medical history of CAD (s/p stent placement in 2004), paroxsymal supraventricular tachycardia, hypertension and hyperlipidemia who presented to the emergency department on 1/1/2024 complaining of chest pain and palpitations. The patient states that she has been having episodes of palpitations and chest pain for the past year. She reports that the episodes can last up to 10-minutes and says that her Apple Watch shows that she is in SVT during the episodes. The patient has seen  (Averaged) 5.97     E/E' Lateral 5.22     LA Volume Index BP 19 16 - 34 ml/m2    LVOT Stroke Volume Index 43.1 mL/m2    LA Volume Index MOD A2C 16 16 - 34 ml/m2    LA Volume Index MOD A4C 21 16 - 34 ml/m2    LA Size Index 2.32 cm/m2    LA/AO Root Ratio 1.23     Ao Root Index 1.89 cm/m2    AV Velocity Ratio 0.92     LVOT:AV VTI Index 0.91     SHAMAR/BSA VTI 1.8 cm2/m2    SHAMAR/BSA Peak Velocity 1.7 cm2/m2       Imaging/Diagnostics:  CT CHEST PULMONARY EMBOLISM W CONTRAST    Result Date: 12/30/2023  No evidence of pulmonary embolism or acute pulmonary abnormality. 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  The remaining branches of the celiac artery appear patent where visualized. Coronary atherosclerosis.       Assessment :      Hospital Problems             Last Modified POA    * (Principal) Unstable angina (HCC) 1/1/2024 Yes    Essential hypertension 1/1/2024 Yes    Hyperlipidemia 1/1/2024 Yes    History of PSVT (paroxysmal supraventricular tachycardia) 1/1/2024 Yes       Plan:     Unstable angina   -Echocardiogram negative for wall-motion abnormalities   -NM stress test pending   -Cardiology is following and has recommended discharge home if stress test is low-risk for ischemia   -Recommend outpatient cath     Splenic artery occlusion   -CTPA showing an age-indeterminate occlusion of the splenic artery in its midportion   -This is an incidental finding and the patient is asymptomatic   -Recommend outpatient vascular surgery evaluation     Hypertension   -Continue home atenolol   -Continue home losartan-HCTZ     Advanced care planning   -The patient does not wish to be resuscitated in the event of a cardiac arrest but would desire aggressive care (including intubation) up until the point of a cardiac arrest   -Code status is DNR-CCA     Patient is admitted as observation status. Expected length of stay < 48 hours. Patient is admitted in the Med/Surge    Naga Giron MD  1/2/2024  10:54

## 2024-01-02 NOTE — CONSULTS
increase to 12.5 mg b.i.d. if feeling well.   2.  Start citalopram 10 mg nightly.   3.  Increase daily fluid intake using 32 ounces of G Zero or Powerade   Zero on a daily basis.   4.  Plan for followup in four weeks at [facility].   The patient was instructed to call the office to arrange a followup   visit.   5. The patient will keep a log of daily blood pressures and was   instructed to bring her home blood pressure monitor to the office at her   followup visit.         Labs:     CBC:   Recent Labs     12/30/23  1705 01/01/24  1644   WBC 6.4 6.3   HGB 14.5 14.9   HCT 42.1 43.4    397     BMP:   Recent Labs     01/01/24  1644 01/02/24  0806    143   K 3.8 3.7   CO2 26 25   BUN 14 12   CREATININE 0.6 0.6   LABGLOM >60 >60   GLUCOSE 115* 108*     BNP: No results for input(s): \"BNP\" in the last 72 hours.  PT/INR:   Recent Labs     12/30/23  1705   PROTIME 10.1   INR 0.9     APTT:No results for input(s): \"APTT\" in the last 72 hours.  CARDIAC ENZYMES:No results for input(s): \"CKTOTAL\", \"CKMB\", \"CKMBINDEX\", \"TROPONINI\" in the last 72 hours.  FASTING LIPID PANEL:  Lab Results   Component Value Date/Time    HDL 61 04/14/2023 10:35 AM    TRIG 78 04/14/2023 10:35 AM     LIVER PROFILE:  Recent Labs     01/01/24  1644   AST 19   ALT 15   LABALBU 4.9       IMPRESSION:    Patient Active Problem List   Diagnosis    Coronary artery disease involving native coronary artery of native heart without angina pectoris    Essential hypertension    Hyperlipidemia    Statin intolerance    Syncope    SVT (supraventricular tachycardia)    Hypokalemia    Unstable angina (HCC)    History of PSVT (paroxysmal supraventricular tachycardia)       RECOMMENDATIONS:  Minimally elevated trops.  Hx of CAD.  Await Stress test results.    ECHO today   Hx of syncope.  Fluids.  Compression stockings   Hx of SVT.  On atenolol   HTN.  Continue current medicaitons  Await Stress and ECHO results.  If low risk, will follow up

## 2024-01-02 NOTE — PLAN OF CARE
Problem: Discharge Planning  Goal: Discharge to home or other facility with appropriate resources  Outcome: Progressing     Problem: Pain  Goal: Verbalizes/displays adequate comfort level or baseline comfort level  Outcome: Progressing     Problem: Safety - Adult  Goal: Free from fall injury  Outcome: Progressing     Problem: ABCDS Injury Assessment  Goal: Absence of physical injury  Outcome: Progressing     Problem: Cardiovascular - Adult  Goal: Maintains optimal cardiac output and hemodynamic stability  Outcome: Progressing     Problem: Cardiovascular - Adult  Goal: Absence of cardiac dysrhythmias or at baseline  Outcome: Progressing

## 2024-01-02 NOTE — PROGRESS NOTES
Patient present for Lexiscan stress test. Educated on procedure. All questions/concerns addressed. Allergies and medication reviewed. Patient prepped for procedure. Stress test will be supervised by DIANNE Hayes.

## 2024-01-02 NOTE — ED PROVIDER NOTES
[facility] Emergency Department  [address]  [place] [address]  Phone: [phone]  Fax: [phone]      Attending Physician Attestation          CHIEF COMPLAINT       Chief Complaint   Patient presents with    Chest Pain     Chest tightness and shortness of breath.  Intermittent symptoms for several days. Recent admit to ER/Hospital for same.        DIAGNOSTIC RESULTS     LABS:  Labs Reviewed   CBC WITH AUTO DIFFERENTIAL - Abnormal; Notable for the following components:       Result Value    Neutrophils % 70 (*)     Lymphocytes % 20 (*)     All other components within normal limits   COMPREHENSIVE METABOLIC PANEL - Abnormal; Notable for the following components:    Glucose 115 (*)     All other components within normal limits   TROPONIN - Abnormal; Notable for the following components:    Troponin, High Sensitivity 16 (*)     All other components within normal limits   TROPONIN - Abnormal; Notable for the following components:    Troponin, High Sensitivity 15 (*)     All other components within normal limits       All other labs were within normal range or not returned as of this dictation.    RADIOLOGY:  No orders to display         EMERGENCY DEPARTMENT COURSE:   Vitals:    Vitals:    01/01/24 1330 01/01/24 1434   BP: (!) 166/79 (!) 182/85   Pulse: 94 82   Resp: 16 18   Temp: 97.8 °F (36.6 °C) 97.7 °F (36.5 °C)   TempSrc: Oral    SpO2: 94% 98%   Weight: 66.7 kg (147 lb) 66.7 kg (147 lb 0.8 oz)   Height: 1.524 m (5') 1.53 m (5' 0.24\")     -------------------------  BP: (!) 182/85, Temp: 97.7 °F (36.5 °C), Pulse: 82, Respirations: 18      ED Course as of 01/01/24 1931 Mon Jan 01, 2024 1833 Patient's case was discussed with the hospitalist Dr. Giron who was agreeable for admission with cardiology consultation.  Troponin at this time is 16 and there is no other significant metabolic abnormalities, and repeat troponin is pending..  Upon reevaluation the patient reports  that she would get this exertional type chest pain, palpitations, and feelings of dyspnea that were intermittent and she had ignored them for some time until she had an MI which required coronary artery stents in 2004.    Because of her increased episodes of palpitations, chest pain, shortness of breath, positive history of coronary artery disease she would likely benefit from admission for inpatient cardiology consultation and stress test. [AH]      ED Course User Index  [AH] Paul Jeffery, APRN - CNP         PERTINENT ATTENDING PHYSICIAN COMMENTS:    I performed a history and physical examination of the patient and discussed management with the mid level provider. I reviewed the mid level provider's note and agree with the documented findings and plan of care. Any areas of disagreement are noted on the chart. I was personally present for the key portions of any procedures. I have documented in the chart those procedures where I was not present during the key portions. I have reviewed the emergency nurses triage note. I agree with the chief complaint, past medical history, past surgical history, allergies, medications, social and family history as documented unless otherwise noted below. Documentation of the HPI, Physical Exam and Medical Decision Making performed by mid level providers is based on my personal performance of the HPI, PE and MDM. For Residents, Physician Assistant/ Nurse Practitioner cases/documentation I have personally evaluated this patient and have completed at least one if not all key elements of the E/M (history, physical exam, and MDM). Additional findings are as noted.    78-year-old female with complaints of palpitations and chest tightness worse with exertion.  History of coronary stents in 2004.  Patient will be admitted to the hospital      (Please note that portions of this note were completed with a voice recognition program.  Efforts were made to edit the dictations but occasionally

## 2024-01-05 LAB
EKG ATRIAL RATE: 85 BPM
EKG P AXIS: 46 DEGREES
EKG P-R INTERVAL: 200 MS
EKG Q-T INTERVAL: 384 MS
EKG QRS DURATION: 96 MS
EKG QTC CALCULATION (BAZETT): 456 MS
EKG R AXIS: -23 DEGREES
EKG T AXIS: 30 DEGREES
EKG VENTRICULAR RATE: 85 BPM

## 2024-01-05 ASSESSMENT — HEART SCORE: ECG: 1

## 2024-03-30 ENCOUNTER — HOSPITAL ENCOUNTER (EMERGENCY)
Age: 79
Discharge: ANOTHER HOSPITAL | End: 2024-03-30
Attending: STUDENT IN AN ORGANIZED HEALTH CARE EDUCATION/TRAINING PROGRAM
Payer: MEDICARE

## 2024-03-30 ENCOUNTER — APPOINTMENT (OUTPATIENT)
Dept: CT IMAGING | Age: 79
End: 2024-03-30
Payer: MEDICARE

## 2024-03-30 ENCOUNTER — APPOINTMENT (OUTPATIENT)
Dept: GENERAL RADIOLOGY | Age: 79
End: 2024-03-30
Payer: MEDICARE

## 2024-03-30 VITALS
BODY MASS INDEX: 28.86 KG/M2 | TEMPERATURE: 99.3 F | WEIGHT: 147 LBS | SYSTOLIC BLOOD PRESSURE: 124 MMHG | HEIGHT: 60 IN | RESPIRATION RATE: 16 BRPM | HEART RATE: 116 BPM | OXYGEN SATURATION: 94 % | DIASTOLIC BLOOD PRESSURE: 65 MMHG

## 2024-03-30 DIAGNOSIS — K92.2 UPPER GI BLEED: Primary | ICD-10-CM

## 2024-03-30 DIAGNOSIS — D62 ACUTE BLOOD LOSS ANEMIA: ICD-10-CM

## 2024-03-30 LAB
ABO/RH: NORMAL
ALBUMIN SERPL-MCNC: 3.2 G/DL (ref 3.5–5.2)
ALBUMIN/GLOB SERPL: 1.5 {RATIO} (ref 1–2.5)
ALP SERPL-CCNC: 66 U/L (ref 35–104)
ALT SERPL-CCNC: 17 U/L (ref 5–33)
ANION GAP SERPL CALCULATED.3IONS-SCNC: 15 MMOL/L (ref 9–17)
ANTIBODY SCREEN: NEGATIVE
ARM BAND NUMBER: NORMAL
AST SERPL-CCNC: 15 U/L
BASOPHILS # BLD: 0.2 K/UL (ref 0–0.2)
BASOPHILS NFR BLD: 1 % (ref 0–2)
BILIRUB SERPL-MCNC: 0.4 MG/DL (ref 0.3–1.2)
BLOOD BANK BLOOD PRODUCT EXPIRATION DATE: NORMAL
BLOOD BANK BLOOD PRODUCT EXPIRATION DATE: NORMAL
BLOOD BANK DISPENSE STATUS: NORMAL
BLOOD BANK DISPENSE STATUS: NORMAL
BLOOD BANK ISBT PRODUCT BLOOD TYPE: 5100
BLOOD BANK ISBT PRODUCT BLOOD TYPE: 9500
BLOOD BANK PRODUCT CODE: NORMAL
BLOOD BANK PRODUCT CODE: NORMAL
BLOOD BANK SAMPLE EXPIRATION: NORMAL
BLOOD BANK UNIT TYPE AND RH: NORMAL
BLOOD BANK UNIT TYPE AND RH: NORMAL
BPU ID: NORMAL
BPU ID: NORMAL
BUN SERPL-MCNC: 35 MG/DL (ref 8–23)
CALCIUM SERPL-MCNC: 8.7 MG/DL (ref 8.6–10.4)
CHLORIDE SERPL-SCNC: 100 MMOL/L (ref 98–107)
CO2 SERPL-SCNC: 20 MMOL/L (ref 20–31)
COMPONENT: NORMAL
COMPONENT: NORMAL
CREAT SERPL-MCNC: 0.7 MG/DL (ref 0.5–0.9)
CROSSMATCH RESULT: NORMAL
CROSSMATCH RESULT: NORMAL
EKG ATRIAL RATE: 101 BPM
EKG P AXIS: 39 DEGREES
EKG P-R INTERVAL: 134 MS
EKG Q-T INTERVAL: 368 MS
EKG QRS DURATION: 86 MS
EKG QTC CALCULATION (BAZETT): 477 MS
EKG R AXIS: 70 DEGREES
EKG T AXIS: 94 DEGREES
EKG VENTRICULAR RATE: 101 BPM
EOSINOPHIL # BLD: 0.7 K/UL (ref 0–0.4)
EOSINOPHILS RELATIVE PERCENT: 5 % (ref 1–4)
ERYTHROCYTE [DISTWIDTH] IN BLOOD BY AUTOMATED COUNT: 14.1 % (ref 12.5–15.4)
GFR SERPL CREATININE-BSD FRML MDRD: 90 ML/MIN/1.73M2
GLUCOSE SERPL-MCNC: 228 MG/DL (ref 70–99)
HCT VFR BLD AUTO: 20.6 % (ref 36–46)
HCT VFR BLD AUTO: 28.1 % (ref 36–46)
HCT VFR BLD AUTO: 29.2 % (ref 36–46)
HGB BLD-MCNC: 10.2 G/DL (ref 12–16)
HGB BLD-MCNC: 6.8 G/DL (ref 12–16)
HGB BLD-MCNC: 9.7 G/DL (ref 12–16)
INR PPP: 1
LIPASE SERPL-CCNC: 27 U/L (ref 13–60)
LYMPHOCYTES NFR BLD: 2.7 K/UL (ref 1–4.8)
LYMPHOCYTES RELATIVE PERCENT: 20 % (ref 24–44)
MCH RBC QN AUTO: 31.4 PG (ref 26–34)
MCHC RBC AUTO-ENTMCNC: 32.9 G/DL (ref 31–37)
MCV RBC AUTO: 95.3 FL (ref 80–100)
MONOCYTES NFR BLD: 1.2 K/UL (ref 0.1–1.2)
MONOCYTES NFR BLD: 9 % (ref 2–11)
NEUTROPHILS NFR BLD: 65 % (ref 36–66)
NEUTS SEG NFR BLD: 8.8 K/UL (ref 1.8–7.7)
PARTIAL THROMBOPLASTIN TIME: <20 SEC (ref 21.3–31.3)
PLATELET # BLD AUTO: 573 K/UL (ref 140–450)
PMV BLD AUTO: 7.8 FL (ref 6–12)
POC TROPONIN I: 0.35 NG/ML (ref 0–0.1)
POC TROPONIN INTERP: ABNORMAL
POTASSIUM SERPL-SCNC: 4.3 MMOL/L (ref 3.7–5.3)
PROT SERPL-MCNC: 5.3 G/DL (ref 6.4–8.3)
PROTHROMBIN TIME: 10.5 SEC (ref 9.4–12.6)
RBC # BLD AUTO: 2.16 M/UL (ref 4–5.2)
SODIUM SERPL-SCNC: 135 MMOL/L (ref 135–144)
TRANSFUSION STATUS: NORMAL
TRANSFUSION STATUS: NORMAL
UNIT DIVISION: 0
UNIT DIVISION: 0
UNIT ISSUE DATE/TIME: NORMAL
UNIT ISSUE DATE/TIME: NORMAL
WBC OTHER # BLD: 13.5 K/UL (ref 3.5–11)

## 2024-03-30 PROCEDURE — 96375 TX/PRO/DX INJ NEW DRUG ADDON: CPT

## 2024-03-30 PROCEDURE — 86900 BLOOD TYPING SEROLOGIC ABO: CPT

## 2024-03-30 PROCEDURE — 71260 CT THORAX DX C+: CPT

## 2024-03-30 PROCEDURE — 84484 ASSAY OF TROPONIN QUANT: CPT

## 2024-03-30 PROCEDURE — 85610 PROTHROMBIN TIME: CPT

## 2024-03-30 PROCEDURE — 83690 ASSAY OF LIPASE: CPT

## 2024-03-30 PROCEDURE — 99291 CRITICAL CARE FIRST HOUR: CPT

## 2024-03-30 PROCEDURE — 2580000003 HC RX 258: Performed by: STUDENT IN AN ORGANIZED HEALTH CARE EDUCATION/TRAINING PROGRAM

## 2024-03-30 PROCEDURE — 6360000004 HC RX CONTRAST MEDICATION: Performed by: STUDENT IN AN ORGANIZED HEALTH CARE EDUCATION/TRAINING PROGRAM

## 2024-03-30 PROCEDURE — 93005 ELECTROCARDIOGRAM TRACING: CPT

## 2024-03-30 PROCEDURE — 86850 RBC ANTIBODY SCREEN: CPT

## 2024-03-30 PROCEDURE — 85014 HEMATOCRIT: CPT

## 2024-03-30 PROCEDURE — 86901 BLOOD TYPING SEROLOGIC RH(D): CPT

## 2024-03-30 PROCEDURE — 96365 THER/PROPH/DIAG IV INF INIT: CPT

## 2024-03-30 PROCEDURE — 70450 CT HEAD/BRAIN W/O DYE: CPT

## 2024-03-30 PROCEDURE — C9113 INJ PANTOPRAZOLE SODIUM, VIA: HCPCS | Performed by: STUDENT IN AN ORGANIZED HEALTH CARE EDUCATION/TRAINING PROGRAM

## 2024-03-30 PROCEDURE — 6360000002 HC RX W HCPCS: Performed by: STUDENT IN AN ORGANIZED HEALTH CARE EDUCATION/TRAINING PROGRAM

## 2024-03-30 PROCEDURE — P9016 RBC LEUKOCYTES REDUCED: HCPCS

## 2024-03-30 PROCEDURE — 6370000000 HC RX 637 (ALT 250 FOR IP): Performed by: EMERGENCY MEDICINE

## 2024-03-30 PROCEDURE — 85025 COMPLETE CBC W/AUTO DIFF WBC: CPT

## 2024-03-30 PROCEDURE — 86920 COMPATIBILITY TEST SPIN: CPT

## 2024-03-30 PROCEDURE — 85730 THROMBOPLASTIN TIME PARTIAL: CPT

## 2024-03-30 PROCEDURE — 96366 THER/PROPH/DIAG IV INF ADDON: CPT

## 2024-03-30 PROCEDURE — 80053 COMPREHEN METABOLIC PANEL: CPT

## 2024-03-30 PROCEDURE — 36415 COLL VENOUS BLD VENIPUNCTURE: CPT

## 2024-03-30 PROCEDURE — 71045 X-RAY EXAM CHEST 1 VIEW: CPT

## 2024-03-30 PROCEDURE — 85018 HEMOGLOBIN: CPT

## 2024-03-30 PROCEDURE — 36430 TRANSFUSION BLD/BLD COMPNT: CPT

## 2024-03-30 RX ORDER — LEVETIRACETAM 10 MG/ML
1000 INJECTION INTRAVASCULAR ONCE
Status: COMPLETED | OUTPATIENT
Start: 2024-03-30 | End: 2024-03-30

## 2024-03-30 RX ORDER — 0.9 % SODIUM CHLORIDE 0.9 %
100 INTRAVENOUS SOLUTION INTRAVENOUS ONCE
Status: DISCONTINUED | OUTPATIENT
Start: 2024-03-30 | End: 2024-03-30 | Stop reason: HOSPADM

## 2024-03-30 RX ORDER — SODIUM CHLORIDE 9 MG/ML
INJECTION, SOLUTION INTRAVENOUS PRN
Status: DISCONTINUED | OUTPATIENT
Start: 2024-03-30 | End: 2024-03-30 | Stop reason: HOSPADM

## 2024-03-30 RX ORDER — ACETAMINOPHEN 325 MG/1
650 TABLET ORAL ONCE
Status: COMPLETED | OUTPATIENT
Start: 2024-03-30 | End: 2024-03-30

## 2024-03-30 RX ORDER — ONDANSETRON 2 MG/ML
4 INJECTION INTRAMUSCULAR; INTRAVENOUS ONCE
Status: DISCONTINUED | OUTPATIENT
Start: 2024-03-30 | End: 2024-03-30 | Stop reason: HOSPADM

## 2024-03-30 RX ORDER — SODIUM CHLORIDE 0.9 % (FLUSH) 0.9 %
5-40 SYRINGE (ML) INJECTION PRN
Status: DISCONTINUED | OUTPATIENT
Start: 2024-03-30 | End: 2024-03-30 | Stop reason: HOSPADM

## 2024-03-30 RX ADMIN — ACETAMINOPHEN 650 MG: 325 TABLET ORAL at 15:19

## 2024-03-30 RX ADMIN — SODIUM CHLORIDE, PRESERVATIVE FREE 10 ML: 5 INJECTION INTRAVENOUS at 05:11

## 2024-03-30 RX ADMIN — Medication 80 ML: at 05:12

## 2024-03-30 RX ADMIN — LEVETIRACETAM 1000 MG: 10 INJECTION, SOLUTION INTRAVENOUS at 03:59

## 2024-03-30 RX ADMIN — SODIUM CHLORIDE 80 MG: 9 INJECTION, SOLUTION INTRAVENOUS at 04:21

## 2024-03-30 RX ADMIN — SODIUM CHLORIDE 8 MG/HR: 9 INJECTION, SOLUTION INTRAVENOUS at 04:25

## 2024-03-30 RX ADMIN — IOPAMIDOL 75 ML: 755 INJECTION, SOLUTION INTRAVENOUS at 05:11

## 2024-03-30 ASSESSMENT — ENCOUNTER SYMPTOMS
WHEEZING: 0
FACIAL SWELLING: 0
DIARRHEA: 0
VOMITING: 1
BACK PAIN: 0
COUGH: 0
BLOOD IN STOOL: 0
SHORTNESS OF BREATH: 0
SORE THROAT: 0
NAUSEA: 1
ABDOMINAL PAIN: 0

## 2024-03-30 NOTE — ED PROVIDER NOTES
ADDENDUM:        The patient was seen for Hematemesis and Seizures  .  The patient's initial evaluation and plan have been discussed with the prior provider who initially evaluated the patient.  Nursing Notes, Past Medical Hx, Past Surgical Hx, Social Hx, Allergies, and Family Hx were all reviewed.    PAST MEDICAL HISTORY    has a past medical history of Acute dermatitis, Coronary atherosclerosis of native coronary artery, Hyperlipemia, Hypertension, and Metabolic syndrome.    SURGICAL HISTORY      has a past surgical history that includes Coronary angioplasty with stent (2004) and Cataract removal.    CURRENT MEDICATIONS       Previous Medications    ASPIRIN 81 MG EC TABLET    Take 1 tablet by mouth Three times week    ATENOLOL (TENORMIN) 25 MG TABLET    Take 0.5 tablets by mouth 2 times daily Decreased dose    LOSARTAN-HYDROCHLOROTHIAZIDE (HYZAAR) 50-12.5 MG PER TABLET    Take 1 tablet by mouth daily 12-4-23 pt now taking QD, see encounter/MN    NITROGLYCERIN (NITROSTAT) 0.4 MG SL TABLET    Place 1 tablet under the tongue every 5 minutes as needed for Chest pain    POLYETHYL GLYCOL-PROPYL GLYCOL (SYSTANE ULTRA OP)    Apply to eye    VITAMIN D (CHOLECALCIFEROL) 125 MCG (5000 UT) CAPS CAPSULE           ALLERGIES     is allergic to cardizem [diltiazem hcl], crestor [rosuvastatin], lipitor [atorvastatin], niaspan [niacin er], norvasc [amlodipine besylate], statins, toprol xl [metoprolol], zocor [simvastatin], terbinafine and related, and zetia [ezetimibe].    FAMILY HISTORY     She indicated that the status of her mother is unknown. She indicated that the status of her sister is unknown. She indicated that the status of her brother is unknown. She indicated that the status of her other is unknown.     family history includes Coronary Art Dis in an other family member; Diabetes in her brother, sister, and another family member; Heart Disease in her brother; Heart Surgery in her brother and another family member;  <20.0 (L) 21.3 - 31.3 sec   Hemoglobin and Hematocrit   Result Value Ref Range    Hemoglobin 9.7 (L) 12.0 - 16.0 g/dL    Hematocrit 28.1 (L) 36 - 46 %   Hemoglobin and Hematocrit   Result Value Ref Range    Hemoglobin 10.2 (L) 12.0 - 16.0 g/dL    Hematocrit 29.2 (L) 36 - 46 %   POCT troponin   Result Value Ref Range    POC Troponin I 0.35 (HH) 0.00 - 0.10 ng/mL    POC Troponin Interp         EKG 12 Lead   Result Value Ref Range    Ventricular Rate 101 BPM    Atrial Rate 101 BPM    P-R Interval 134 ms    QRS Duration 86 ms    Q-T Interval 368 ms    QTc Calculation (Bazett) 477 ms    P Axis 39 degrees    R Axis 70 degrees    T Axis 94 degrees   TYPE AND SCREEN   Result Value Ref Range    Blood Bank Sample Expiration 04/02/2024,2359     Arm Band Number [id number]     ABO/Rh O POSITIVE     Antibody Screen NEGATIVE     Unit Number [id number]     Component Leukocyte Reduced Red Cell     Unit Divison 00     Dispense Status Blood Bank ISSUED     Unit Issue Date/Time 691871116853     Product Code Blood Bank [id number]     Blood Bank Unit Type and Rh O NEG     Blood Bank ISBT Product Blood Type 9500     Blood Bank Blood Product Expiration Date 594148581507     Transfusion Status OK TO TRANSFUSE     Crossmatch Result COMPATIBLE     Unit Number [id number]     Component Leukocyte Reduced Red Cell     Unit Divison 00     Dispense Status Blood Bank ISSUED     Unit Issue Date/Time 881699181634     Product Code Blood Bank [id number]     Blood Bank Unit Type and Rh O POS     Blood Bank ISBT Product Blood Type 5100     Blood Bank Blood Product Expiration Date 645891765512     Transfusion Status OK TO TRANSFUSE     Crossmatch Result COMPATIBLE        RADIOLOGY:  CT CHEST ABDOMEN PELVIS W CONTRAST Additional Contrast? None   Final Result   Small bilateral pleural effusions with adjacent consolidation representing   atelectasis versus pneumonia.      Mild ectasia of the ascending thoracic aorta.      Sternal sutures with minimal pre

## 2024-03-30 NOTE — ED PROVIDER NOTES
[facility] EMERGENCY DEPARTMENT  EMERGENCY DEPARTMENT ENCOUNTER      Pt Name: Sruthi Sandhu  MRN: [id number]  Birthdate 1945  Date of evaluation: 3/30/2024  Provider: Giovanny Arroyo DO    CHIEF COMPLAINT       Chief Complaint   Patient presents with    Hematemesis    Seizures         HISTORY OF PRESENT ILLNESS   (Location/Symptom, Timing/Onset, Context/Setting, Quality, Duration, Modifying Factors, Severity)  Note limiting factors.   Sruthi Sandhu is a 78 y.o. female who presents to the emergency department with vomiting blood and seizure-like activity.  Patient was admitted for a CABG about 1 week ago at [facility].  EMS was called out today for seizure-like activity and afterwards vomiting blood with clots.  On EMS arrival, patient had another episode of seizure-like activity and vomiting blood and clots.  Patient denies fevers, chest pain, shortness of breath, abdominal pain.  Patient is awake and alert and oriented currently but is very pale appearing.  She notes that she takes baby aspirin but denies any other blood thinner use.    HPI    Nursing Notes were reviewed.    REVIEW OF SYSTEMS    (2-9 systems for level 4, 10 or more for level 5)     Review of Systems   Constitutional:  Positive for fatigue. Negative for chills and fever.   HENT:  Negative for congestion, facial swelling and sore throat.    Eyes:  Negative for visual disturbance.   Respiratory:  Negative for cough, shortness of breath and wheezing.    Cardiovascular:  Negative for chest pain, palpitations and leg swelling.   Gastrointestinal:  Positive for nausea and vomiting. Negative for abdominal pain, blood in stool and diarrhea.        Positive for bloody vomit with clots   Genitourinary:  Negative for dysuria and hematuria.   Musculoskeletal:  Negative for back pain and neck pain.   Skin:  Positive for pallor.   Neurological:  Negative for syncope, weakness, numbness and headaches.        Positive for witnessed

## 2024-03-30 NOTE — ED NOTES
1 unit PRBC ordered and infusing emergently.  Blood unable to be scanned.  Blood verified with NARCISA Purdy.  Lab informed of difficulty scanning blood emergently.

## 2024-03-31 LAB
EKG ATRIAL RATE: 101 BPM
EKG P AXIS: 39 DEGREES
EKG P-R INTERVAL: 134 MS
EKG Q-T INTERVAL: 368 MS
EKG QRS DURATION: 86 MS
EKG QTC CALCULATION (BAZETT): 477 MS
EKG R AXIS: 70 DEGREES
EKG T AXIS: 94 DEGREES
EKG VENTRICULAR RATE: 101 BPM

## 2024-04-01 LAB
ABO/RH: NORMAL
ANTIBODY SCREEN: NEGATIVE
ARM BAND NUMBER: NORMAL
BLOOD BANK BLOOD PRODUCT EXPIRATION DATE: NORMAL
BLOOD BANK BLOOD PRODUCT EXPIRATION DATE: NORMAL
BLOOD BANK DISPENSE STATUS: NORMAL
BLOOD BANK DISPENSE STATUS: NORMAL
BLOOD BANK ISBT PRODUCT BLOOD TYPE: 5100
BLOOD BANK ISBT PRODUCT BLOOD TYPE: 9500
BLOOD BANK PRODUCT CODE: NORMAL
BLOOD BANK PRODUCT CODE: NORMAL
BLOOD BANK SAMPLE EXPIRATION: NORMAL
BLOOD BANK UNIT TYPE AND RH: NORMAL
BLOOD BANK UNIT TYPE AND RH: NORMAL
BPU ID: NORMAL
BPU ID: NORMAL
COMPONENT: NORMAL
COMPONENT: NORMAL
CROSSMATCH RESULT: NORMAL
CROSSMATCH RESULT: NORMAL
TRANSFUSION STATUS: NORMAL
TRANSFUSION STATUS: NORMAL
UNIT DIVISION: 0
UNIT DIVISION: 0
UNIT ISSUE DATE/TIME: NORMAL
UNIT ISSUE DATE/TIME: NORMAL

## 2024-06-22 ENCOUNTER — HOSPITAL ENCOUNTER (INPATIENT)
Age: 79
LOS: 1 days | Discharge: NURSING, REHAB OR LONG-TERM CARE | DRG: 066 | End: 2024-06-26
Attending: EMERGENCY MEDICINE | Admitting: STUDENT IN AN ORGANIZED HEALTH CARE EDUCATION/TRAINING PROGRAM
Payer: MEDICARE

## 2024-06-22 ENCOUNTER — APPOINTMENT (OUTPATIENT)
Dept: CT IMAGING | Age: 79
DRG: 066 | End: 2024-06-22
Payer: MEDICARE

## 2024-06-22 ENCOUNTER — APPOINTMENT (OUTPATIENT)
Dept: GENERAL RADIOLOGY | Age: 79
DRG: 066 | End: 2024-06-22
Payer: MEDICARE

## 2024-06-22 DIAGNOSIS — I63.9 CEREBROVASCULAR ACCIDENT (CVA), UNSPECIFIED MECHANISM (HCC): Primary | ICD-10-CM

## 2024-06-22 DIAGNOSIS — R29.90 STROKE-LIKE SYMPTOMS: ICD-10-CM

## 2024-06-22 PROBLEM — Z95.1 S/P CABG X 3: Status: ACTIVE | Noted: 2024-06-22

## 2024-06-22 PROBLEM — R53.1 GENERALIZED WEAKNESS: Status: ACTIVE | Noted: 2024-06-22

## 2024-06-22 PROBLEM — F33.1 MODERATE EPISODE OF RECURRENT MAJOR DEPRESSIVE DISORDER (HCC): Status: ACTIVE | Noted: 2024-06-22

## 2024-06-22 PROBLEM — K21.9 GASTROESOPHAGEAL REFLUX DISEASE WITHOUT ESOPHAGITIS: Status: ACTIVE | Noted: 2024-06-22

## 2024-06-22 LAB
ANION GAP SERPL CALCULATED.3IONS-SCNC: 11 MMOL/L (ref 9–17)
BASOPHILS # BLD: 0.02 K/UL (ref 0–0.2)
BASOPHILS NFR BLD: 0 % (ref 0–2)
BILIRUB UR QL STRIP: NEGATIVE
BUN SERPL-MCNC: 11 MG/DL (ref 8–23)
CALCIUM SERPL-MCNC: 9.3 MG/DL (ref 8.6–10.4)
CHLORIDE SERPL-SCNC: 102 MMOL/L (ref 98–107)
CLARITY UR: CLEAR
CO2 SERPL-SCNC: 28 MMOL/L (ref 20–31)
COLOR UR: YELLOW
COMMENT: NORMAL
CREAT SERPL-MCNC: 0.5 MG/DL (ref 0.5–0.9)
EOSINOPHIL # BLD: 0.31 K/UL (ref 0–0.4)
EOSINOPHILS RELATIVE PERCENT: 6 % (ref 1–4)
ERYTHROCYTE [DISTWIDTH] IN BLOOD BY AUTOMATED COUNT: 13.2 % (ref 12.5–15.4)
GFR, ESTIMATED: >90 ML/MIN/1.73M2
GLUCOSE SERPL-MCNC: 90 MG/DL (ref 70–99)
GLUCOSE UR STRIP-MCNC: NEGATIVE MG/DL
HCT VFR BLD AUTO: 36.7 % (ref 36–46)
HGB BLD-MCNC: 11.7 G/DL (ref 12–16)
HGB UR QL STRIP.AUTO: NEGATIVE
KETONES UR STRIP-MCNC: NEGATIVE MG/DL
LEUKOCYTE ESTERASE UR QL STRIP: NEGATIVE
LYMPHOCYTES NFR BLD: 1.28 K/UL (ref 1–4.8)
LYMPHOCYTES RELATIVE PERCENT: 23 % (ref 24–44)
MCH RBC QN AUTO: 29 PG (ref 26–34)
MCHC RBC AUTO-ENTMCNC: 31.9 G/DL (ref 31–37)
MCV RBC AUTO: 90.8 FL (ref 80–100)
MONOCYTES NFR BLD: 0.72 K/UL (ref 0.1–1.2)
MONOCYTES NFR BLD: 13 % (ref 2–11)
NEUTROPHILS NFR BLD: 58 % (ref 36–66)
NEUTS SEG NFR BLD: 3.23 K/UL (ref 1.8–7.7)
NITRITE UR QL STRIP: NEGATIVE
PH UR STRIP: 7 [PH] (ref 5–8)
PLATELET # BLD AUTO: 387 K/UL (ref 140–450)
PMV BLD AUTO: 9.8 FL (ref 8–14)
POTASSIUM SERPL-SCNC: 3.7 MMOL/L (ref 3.7–5.3)
PROT UR STRIP-MCNC: NEGATIVE MG/DL
RBC # BLD AUTO: 4.04 M/UL (ref 4–5.2)
SODIUM SERPL-SCNC: 141 MMOL/L (ref 135–144)
SP GR UR STRIP: 1.01 (ref 1–1.03)
TROPONIN I SERPL HS-MCNC: 11 NG/L (ref 0–14)
TSH SERPL DL<=0.05 MIU/L-ACNC: 3.72 UIU/ML (ref 0.3–5)
UROBILINOGEN UR STRIP-ACNC: NORMAL EU/DL (ref 0–1)
WBC OTHER # BLD: 5.6 K/UL (ref 3.5–11)

## 2024-06-22 PROCEDURE — 99285 EMERGENCY DEPT VISIT HI MDM: CPT

## 2024-06-22 PROCEDURE — 84443 ASSAY THYROID STIM HORMONE: CPT

## 2024-06-22 PROCEDURE — 84484 ASSAY OF TROPONIN QUANT: CPT

## 2024-06-22 PROCEDURE — 81003 URINALYSIS AUTO W/O SCOPE: CPT

## 2024-06-22 PROCEDURE — 36415 COLL VENOUS BLD VENIPUNCTURE: CPT

## 2024-06-22 PROCEDURE — G0378 HOSPITAL OBSERVATION PER HR: HCPCS

## 2024-06-22 PROCEDURE — 80048 BASIC METABOLIC PNL TOTAL CA: CPT

## 2024-06-22 PROCEDURE — 6360000004 HC RX CONTRAST MEDICATION: Performed by: NURSE PRACTITIONER

## 2024-06-22 PROCEDURE — 96372 THER/PROPH/DIAG INJ SC/IM: CPT

## 2024-06-22 PROCEDURE — 93005 ELECTROCARDIOGRAM TRACING: CPT | Performed by: NURSE PRACTITIONER

## 2024-06-22 PROCEDURE — 6360000002 HC RX W HCPCS: Performed by: INTERNAL MEDICINE

## 2024-06-22 PROCEDURE — 2580000003 HC RX 258: Performed by: NURSE PRACTITIONER

## 2024-06-22 PROCEDURE — 2580000003 HC RX 258: Performed by: INTERNAL MEDICINE

## 2024-06-22 PROCEDURE — 70450 CT HEAD/BRAIN W/O DYE: CPT

## 2024-06-22 PROCEDURE — 71045 X-RAY EXAM CHEST 1 VIEW: CPT

## 2024-06-22 PROCEDURE — 85025 COMPLETE CBC W/AUTO DIFF WBC: CPT

## 2024-06-22 PROCEDURE — 70498 CT ANGIOGRAPHY NECK: CPT

## 2024-06-22 PROCEDURE — 99222 1ST HOSP IP/OBS MODERATE 55: CPT

## 2024-06-22 RX ORDER — SODIUM CHLORIDE 9 MG/ML
INJECTION, SOLUTION INTRAVENOUS PRN
Status: DISCONTINUED | OUTPATIENT
Start: 2024-06-22 | End: 2024-06-26 | Stop reason: HOSPADM

## 2024-06-22 RX ORDER — SODIUM CHLORIDE 0.9 % (FLUSH) 0.9 %
10 SYRINGE (ML) INJECTION EVERY 12 HOURS SCHEDULED
Status: DISCONTINUED | OUTPATIENT
Start: 2024-06-22 | End: 2024-06-26 | Stop reason: HOSPADM

## 2024-06-22 RX ORDER — CLOBETASOL PROPIONATE 0.5 MG/G
CREAM TOPICAL DAILY PRN
Status: DISCONTINUED | OUTPATIENT
Start: 2024-06-22 | End: 2024-06-26 | Stop reason: HOSPADM

## 2024-06-22 RX ORDER — VITAMIN B COMPLEX
5000 TABLET ORAL DAILY
Status: DISCONTINUED | OUTPATIENT
Start: 2024-06-22 | End: 2024-06-26 | Stop reason: HOSPADM

## 2024-06-22 RX ORDER — ONDANSETRON 4 MG/1
4 TABLET, ORALLY DISINTEGRATING ORAL EVERY 8 HOURS PRN
Status: DISCONTINUED | OUTPATIENT
Start: 2024-06-22 | End: 2024-06-26 | Stop reason: HOSPADM

## 2024-06-22 RX ORDER — FUROSEMIDE 20 MG/1
20 TABLET ORAL DAILY PRN
Status: DISCONTINUED | OUTPATIENT
Start: 2024-06-22 | End: 2024-06-23

## 2024-06-22 RX ORDER — METOPROLOL SUCCINATE 50 MG/1
50 TABLET, EXTENDED RELEASE ORAL DAILY
Status: DISCONTINUED | OUTPATIENT
Start: 2024-06-23 | End: 2024-06-23

## 2024-06-22 RX ORDER — 0.9 % SODIUM CHLORIDE 0.9 %
1000 INTRAVENOUS SOLUTION INTRAVENOUS ONCE
Status: COMPLETED | OUTPATIENT
Start: 2024-06-22 | End: 2024-06-22

## 2024-06-22 RX ORDER — PANTOPRAZOLE SODIUM 40 MG/1
40 TABLET, DELAYED RELEASE ORAL
Status: DISCONTINUED | OUTPATIENT
Start: 2024-06-23 | End: 2024-06-26 | Stop reason: HOSPADM

## 2024-06-22 RX ORDER — ATENOLOL 25 MG/1
12.5 TABLET ORAL 2 TIMES DAILY
Status: DISCONTINUED | OUTPATIENT
Start: 2024-06-22 | End: 2024-06-22

## 2024-06-22 RX ORDER — HYDROCHLOROTHIAZIDE 25 MG/1
25 TABLET ORAL DAILY
Status: DISCONTINUED | OUTPATIENT
Start: 2024-06-22 | End: 2024-06-23

## 2024-06-22 RX ORDER — LOSARTAN POTASSIUM 25 MG/1
25 TABLET ORAL DAILY
Status: DISCONTINUED | OUTPATIENT
Start: 2024-06-23 | End: 2024-06-22

## 2024-06-22 RX ORDER — NITROGLYCERIN 0.4 MG/1
0.4 TABLET SUBLINGUAL EVERY 5 MIN PRN
Status: DISCONTINUED | OUTPATIENT
Start: 2024-06-22 | End: 2024-06-22

## 2024-06-22 RX ORDER — SPIRONOLACTONE 25 MG/1
25 TABLET ORAL DAILY
Status: ON HOLD | COMMUNITY
End: 2024-06-22

## 2024-06-22 RX ORDER — ONDANSETRON 2 MG/ML
4 INJECTION INTRAMUSCULAR; INTRAVENOUS EVERY 6 HOURS PRN
Status: DISCONTINUED | OUTPATIENT
Start: 2024-06-22 | End: 2024-06-26 | Stop reason: HOSPADM

## 2024-06-22 RX ORDER — NEBIVOLOL 5 MG/1
5 TABLET ORAL NIGHTLY
Status: ON HOLD | COMMUNITY
End: 2024-06-25 | Stop reason: HOSPADM

## 2024-06-22 RX ORDER — FAMOTIDINE 20 MG/1
20 TABLET, FILM COATED ORAL DAILY
Status: ON HOLD | COMMUNITY
End: 2024-06-25 | Stop reason: HOSPADM

## 2024-06-22 RX ORDER — ACETAMINOPHEN 325 MG/1
650 TABLET ORAL EVERY 4 HOURS PRN
Status: DISCONTINUED | OUTPATIENT
Start: 2024-06-22 | End: 2024-06-26 | Stop reason: HOSPADM

## 2024-06-22 RX ORDER — 0.9 % SODIUM CHLORIDE 0.9 %
80 INTRAVENOUS SOLUTION INTRAVENOUS ONCE
Status: DISCONTINUED | OUTPATIENT
Start: 2024-06-22 | End: 2024-06-26 | Stop reason: HOSPADM

## 2024-06-22 RX ORDER — POTASSIUM CHLORIDE 20 MEQ/1
20 TABLET, EXTENDED RELEASE ORAL DAILY PRN
COMMUNITY

## 2024-06-22 RX ORDER — FUROSEMIDE 20 MG/1
20 TABLET ORAL DAILY PRN
COMMUNITY

## 2024-06-22 RX ORDER — SPIRONOLACTONE 25 MG/1
25 TABLET ORAL DAILY
Status: DISCONTINUED | OUTPATIENT
Start: 2024-06-22 | End: 2024-06-22

## 2024-06-22 RX ORDER — LOSARTAN POTASSIUM 25 MG/1
25 TABLET ORAL DAILY
COMMUNITY

## 2024-06-22 RX ORDER — LOSARTAN POTASSIUM 25 MG/1
25 TABLET ORAL DAILY
Status: DISCONTINUED | OUTPATIENT
Start: 2024-06-22 | End: 2024-06-26 | Stop reason: HOSPADM

## 2024-06-22 RX ORDER — SODIUM CHLORIDE 0.9 % (FLUSH) 0.9 %
10 SYRINGE (ML) INJECTION PRN
Status: DISCONTINUED | OUTPATIENT
Start: 2024-06-22 | End: 2024-06-26 | Stop reason: HOSPADM

## 2024-06-22 RX ORDER — ASPIRIN 81 MG/1
81 TABLET ORAL DAILY
Status: DISCONTINUED | OUTPATIENT
Start: 2024-06-22 | End: 2024-06-26 | Stop reason: HOSPADM

## 2024-06-22 RX ORDER — ENOXAPARIN SODIUM 100 MG/ML
40 INJECTION SUBCUTANEOUS DAILY
Status: DISCONTINUED | OUTPATIENT
Start: 2024-06-22 | End: 2024-06-26 | Stop reason: HOSPADM

## 2024-06-22 RX ORDER — PANTOPRAZOLE SODIUM 40 MG/1
40 TABLET, DELAYED RELEASE ORAL
Status: ON HOLD | COMMUNITY
End: 2024-06-22

## 2024-06-22 RX ORDER — ACETAMINOPHEN 650 MG/1
650 SUPPOSITORY RECTAL EVERY 4 HOURS PRN
Status: DISCONTINUED | OUTPATIENT
Start: 2024-06-22 | End: 2024-06-26 | Stop reason: HOSPADM

## 2024-06-22 RX ORDER — HYDROCHLOROTHIAZIDE 25 MG/1
25 TABLET ORAL DAILY
Status: ON HOLD | COMMUNITY
End: 2024-06-25 | Stop reason: HOSPADM

## 2024-06-22 RX ORDER — SODIUM CHLORIDE 0.9 % (FLUSH) 0.9 %
10 SYRINGE (ML) INJECTION ONCE
Status: COMPLETED | OUTPATIENT
Start: 2024-06-22 | End: 2024-06-22

## 2024-06-22 RX ORDER — POTASSIUM CHLORIDE 20 MEQ/1
20 TABLET, EXTENDED RELEASE ORAL DAILY PRN
Status: DISCONTINUED | OUTPATIENT
Start: 2024-06-22 | End: 2024-06-26 | Stop reason: HOSPADM

## 2024-06-22 RX ORDER — CLOBETASOL PROPIONATE 0.5 MG/G
CREAM TOPICAL DAILY PRN
COMMUNITY

## 2024-06-22 RX ADMIN — SODIUM CHLORIDE 1000 ML: 9 INJECTION, SOLUTION INTRAVENOUS at 14:51

## 2024-06-22 RX ADMIN — Medication 80 ML: at 15:52

## 2024-06-22 RX ADMIN — SODIUM CHLORIDE, PRESERVATIVE FREE 10 ML: 5 INJECTION INTRAVENOUS at 23:27

## 2024-06-22 RX ADMIN — SODIUM CHLORIDE, PRESERVATIVE FREE 10 ML: 5 INJECTION INTRAVENOUS at 15:52

## 2024-06-22 RX ADMIN — IOPAMIDOL 100 ML: 755 INJECTION, SOLUTION INTRAVENOUS at 15:52

## 2024-06-22 RX ADMIN — ENOXAPARIN SODIUM 40 MG: 100 INJECTION SUBCUTANEOUS at 23:27

## 2024-06-22 ASSESSMENT — PAIN SCALES - GENERAL: PAINLEVEL_OUTOF10: 2

## 2024-06-22 ASSESSMENT — PAIN - FUNCTIONAL ASSESSMENT: PAIN_FUNCTIONAL_ASSESSMENT: 0-10

## 2024-06-22 NOTE — VIRTUAL HEALTH
Sruthi Sandhu, was evaluated through a synchronous (real-time) audio-video encounter. The patient (and/or guardian if applicable) is aware that this is a billable service, which includes applicable co-pays. This virtual visit was conducted with patient's (and/or legal guardian's) consent. Patient identification was verified, and a caregiver was present when appropriate.  The patient was located at Facility (Appt Department): [facility] EMERGENCY DEPARTMENT  [address]  [place] [address]  Loc: [phone]  The provider was located at Facility (Login Dept): [facility] TELESTROKE  [address] [place] [address] [phone]  Confirm you are appropriately licensed, registered, or certified to deliver care in the [facility] where the patient is located as indicated above. If you are not or unsure, please re-schedule the visit: Yes, I confirm.   Consults     Total time spent on this encounter:  40    --Karli Rajan MD on 6/22/2024 at 4:09 PM    An electronic signature was used to authenticate this note.        [facility] Stroke and Telestroke Consult for  [place] [facility] Stroke Alert through [employer] @   6/22/2024 4:09 PM    Pt Name: Sruthi Sandhu  MRN: [id number]  YOB: 1945  Date of evaluation: 6/22/2024  Primary Care Physician: Meg Valero MD  Reason for Evaluation: Stroke evaluation with Phone Consult, Discussion and Review of imaging    Sruthi Sandhu is a 78 y.o. female who presents to the emergency department for further evaluation of generalized fatigue and weakness.  Patient was outside sweeping the walkway today when she was approached by her neighbor who thought that she seemed very weak and needed to be evaluated by EMS.  EMS was called.  EMS stated they performed a neuroassessment that was negative.  Patient was brought into the emergency department for further evaluation.  Patient states she feels  generally fatigued.  She is awake alert and oriented x 4.  No acute neurological deficit noted.     CT head: Compared with prior CT from 03/30/2024, there is increased hypodensity along  the bilateral basal ganglia, right greater than left, which could be due to  acute ischemia.  Suggest MRI for further evaluation.      LKW: unknown   NIH:  0    Allergies  is allergic to cardizem [diltiazem hcl], crestor [rosuvastatin], lipitor [atorvastatin], niaspan [niacin er], norvasc [amlodipine besylate], statins, toprol xl [metoprolol], zocor [simvastatin], terbinafine and related, and zetia [ezetimibe].  Medications  Prior to Admission medications    Medication Sig Start Date End Date Taking? Authorizing Provider   losartan-hydroCHLOROthiazide (HYZAAR) 50-12.5 MG per tablet Take 1 tablet by mouth daily 12-4-23 pt now taking QD, see encounter/MN  Patient taking differently: Take 1 tablet by mouth daily 12-4-23 pt now taking QD, see encounter/MN.  Pt taking 1/2 tablet now. 12/4/23   Bang Mcdonald MD   vitamin D (CHOLECALCIFEROL) 125 MCG (5000 UT) CAPS capsule  9/12/23   Amado Zelaya MD   atenolol (TENORMIN) 25 MG tablet Take 0.5 tablets by mouth 2 times daily Decreased dose  Patient taking differently: Take 0.5 tablets by mouth daily Decreased dose 7/17/23   Bang Mcdonald MD   Polyethyl Glycol-Propyl Glycol (SYSTANE ULTRA OP) Apply to eye    Amado Zelaya MD   aspirin 81 MG EC tablet Take 1 tablet by mouth Three times week    Amado Zelaya MD   nitroGLYCERIN (NITROSTAT) 0.4 MG SL tablet Place 1 tablet under the tongue every 5 minutes as needed for Chest pain    Amado Zelaya MD    Scheduled Meds:   sodium chloride  80 mL IntraVENous Once     Continuous Infusions:  PRN Meds:.  Past Medical History   has a past medical history of Acute dermatitis, Coronary atherosclerosis of native coronary artery, Hyperlipemia, Hypertension, and Metabolic syndrome.  Social History  Social History      Socioeconomic History    Marital status: Single     Spouse name: Not on file    Number of children: Not on file    Years of education: Not on file    Highest education level: Not on file   Occupational History    Not on file   Tobacco Use    Smoking status: Never    Smokeless tobacco: Never   Substance and Sexual Activity    Alcohol use: Yes     Alcohol/week: 0.0 standard drinks of alcohol     Comment: social    Drug use: No    Sexual activity: Not on file   Other Topics Concern    Not on file   Social History Narrative    Not on file     Social Determinants of Health     Financial Resource Strain: Not on file   Food Insecurity: No Food Insecurity (1/2/2024)    Hunger Vital Sign     Worried About Running Out of Food in the Last Year: Never true     Ran Out of Food in the Last Year: Never true   Transportation Needs: No Transportation Needs (1/2/2024)    PRAPARE - Transportation     Lack of Transportation (Medical): No     Lack of Transportation (Non-Medical): No   Physical Activity: Not on file   Stress: Not on file   Social Connections: Not on file   Intimate Partner Violence: Not on file   Housing Stability: Low Risk  (1/2/2024)    Housing Stability Vital Sign     Unable to Pay for Housing in the Last Year: No     Number of Places Lived in the Last Year: 1     Unstable Housing in the Last Year: No     Family History      Problem Relation Age of Onset    Hypertension Mother     Stroke Mother     Diabetes Sister     Hypertension Sister     Heart Disease Brother     Diabetes Brother     Heart Surgery Brother     Lung Cancer Brother     Hypertension Other     Stroke Other     Heart Surgery Other     Diabetes Other     Coronary Art Dis Other        OBJECTIVE  BP (!) 149/75   Pulse 59   Temp 98.4 °F (36.9 °C) (Oral)   Resp 10   Ht 1.524 m (5')   Wt 63 kg (138 lb 14.2 oz)   SpO2 97%   BMI 27.13 kg/m²        Pre-Morbid mRS: 0        Recommendations:  NIH 0  Recommend Inpatient Neurology Consult for further

## 2024-06-22 NOTE — ED PROVIDER NOTES
[facility] EMERGENCY DEPARTMENT  EMERGENCY DEPARTMENT ENCOUNTER      Pt Name: Sruthi Sandhu  MRN: [id number]  Birthdate 1945  Date of evaluation: 6/22/2024  Provider: WOODY Mars CNP    CHIEF COMPLAINT       Chief Complaint   Patient presents with    Fatigue         HISTORY OF PRESENT ILLNESS   (Location/Symptom, Timing/Onset, Context/Setting, Quality, Duration, Modifying Factors, Severity)  Note limiting factors.   Sruthi Sandhu is a 78 y.o. female who presents to the emergency department for further evaluation of generalized fatigue and weakness.  Patient was outside sweeping the walkway today when she was approached by her neighbor who thought that she seemed very weak wasn't acting like herself and needed to be evaluated so they called EMS.  EMS reportedly performed a neuroassessment that was negative.  Patient was brought into the emergency department for further evaluation.  Patient states she feels generally fatigued.  She is awake alert and oriented x 4.  No acute neurological deficit noted.  She apparently had a EGD performed yesterday under anesthesia.  She lives alone.         Nursing Notes were reviewed.    REVIEW OF SYSTEMS       Constitutional: No fevers or chills + generalized weak  HEENT: No sore throat, rhinorrhea, or earache   Eyes: No blurry vision or double vision no drainage   Cardiovascular: No chest pain or tachycardia   Respiratory: No wheezing or shortness of breath no cough   Gastrointestinal: No nausea, vomiting, diarrhea, constipation, or abdominal pain   : No hematuria or dysuria   Musculoskeletal: No swelling or pain   Skin: No rash   Neurological: No focal neurologic complaints, paresthesias, weakness, or headache      Except as noted above the remainder of the review of systems was reviewed and negative.       PAST MEDICAL HISTORY     Past Medical History:   Diagnosis Date    Acute dermatitis     Coronary atherosclerosis of native coronary artery     the bilateral basal ganglia, right greater than left, which could be due to   acute ischemia.  Suggest MRI for further evaluation.               ED BEDSIDE ULTRASOUND:   Performed by ED Physician - none    LABS:  Labs Reviewed   CBC WITH AUTO DIFFERENTIAL - Abnormal; Notable for the following components:       Result Value    Hemoglobin 11.7 (*)     Lymphocytes % 23 (*)     Monocytes % 13 (*)     Eosinophils % 6 (*)     All other components within normal limits   BASIC METABOLIC PANEL   TROPONIN   URINALYSIS WITH REFLEX TO CULTURE   TSH WITH REFLEX       All other labs were within normal range or not returned as of this dictation.    EMERGENCY DEPARTMENT COURSE and DIFFERENTIAL DIAGNOSIS/MDM:   Vitals:    Vitals:    06/22/24 1342 06/22/24 1741   BP: (!) 149/75 137/80   Pulse: 59 63   Resp: 10    Temp: 98.4 °F (36.9 °C)    TempSrc: Oral    SpO2: 97% 96%   Weight: 63 kg (138 lb 14.2 oz)    Height: 1.524 m (5')            REASSESSMENT     ED Course as of 06/22/24 2025   Sat Jun 22, 2024   1545 Neurology paged per access for further recommendation.  [AR]   1613 Spoke to  neurology who recommends MRI.  He does not feel this stroke is acute.  He would like the patient to be admitted and have an MRI.  He states if the MRI cannot be done tonight first thing in the morning would be appropriate.  He does not recommend any additional anticoagulation or antiplatelet.  He would like to continue aspirin 81 mg daily and add a statin daily. [AR]   1629 Patient updated on recommendation of admission by neurology.  Patient states she believes she may have had a stroke in the past.  Awaiting urine sample.  [AR]   1648 We do not have MRI capability today or tomorrow.  I repaged neurology for further recommendation. [AR]   1700 Spoke to Dr Lopez he recommends to admit here and obtain MRI on Monday.  [AR]      ED Course User Index  [AR] Mickie Mccall, APRN - CNP         PROCEDURES:  Unless otherwise noted below, none

## 2024-06-22 NOTE — H&P
[facility]  Office: [phone]  Michael Bravo DO, Jules Jeffries DO, Yoel Jackson DO, Harvey Garcia DO, Omer Vallejo MD, Randa Mireles MD, Mary Gill MD, Lynette Otto MD,  Erasmo Dowd MD, Fidelina Gonzales MD, Anita Angelo MD,  Karli Shore DO, Andreea Stover MD, Naga Giron MD, Hamilton Bravo DO, Ambreen Reid MD,  Ever Stevens DO, Paulina Martinez MD, Michelle Estrada MD, Magdalene Miner MD, Aldair Appiah MD,  José Antonio Eckert MD, Jo Ann Pyle MD, Trent Cochran MD, Nelson Duque MD, Ap Diehl MD, Lillie Pelletier MD, Florencio Dorman DO, Caleb Prakash DO, Melisa Diaz MD,  Stephan Rodríguez MD, Shirley Waterhouse, CNP,  Kay Ricardo CNP, Anatoly Madrigal, CNP,  Rosina Orellana, DNP, Bhavna Andrade, CNP, Brianna Lopez, CNP, Mercedes Ford, CNP, Angy Enrique, CNP, Luzmaria Moreland, PA-C, Mayda Jones PA-C, Tiffany Alcazar, CNP, Cande Sandra, CNP, Demetra Smith, CNP, Chrystal Marie, CNP, Linda Chirinos, CNP, Deepika Bradshaw, CNS, Cielo Licea, CNP, Kelly Espino CNP, Tracy Schwab, CNP         [facility]   IN-PATIENT SERVICE   [facility]    HISTORY AND PHYSICAL EXAMINATION            Date:   6/22/2024  Patient name:  Sruthi Sandhu  Date of admission:  6/22/2024  1:42 PM  MRN:   [id number]  Account:  [id number]  YOB: 1945  PCP:    Meg Valero MD  Room:   ER05/ER05  Code Status:    Prior    Chief Complaint:     Chief Complaint   Patient presents with    Fatigue     History Obtained From:     patient, electronic medical record    History of Present Illness:     Sruthi Sandhu is a 78 y.o. Non- / non  female who presents with Fatigue   and is admitted to the hospital for the management of Generalized weakness.    Patient is a 78-year-old female who was admitted for management of generalized weakness and fatigue.  Patient is an unclear historian, reporting vague symptoms, but is unclear when they

## 2024-06-23 PROBLEM — I63.9 CEREBROVASCULAR ACCIDENT (CVA) (HCC): Status: ACTIVE | Noted: 2024-06-23

## 2024-06-23 LAB
ANION GAP SERPL CALCULATED.3IONS-SCNC: 10 MMOL/L (ref 9–17)
BUN SERPL-MCNC: 10 MG/DL (ref 8–23)
CALCIUM SERPL-MCNC: 9.1 MG/DL (ref 8.6–10.4)
CHLORIDE SERPL-SCNC: 104 MMOL/L (ref 98–107)
CHOLEST SERPL-MCNC: 125 MG/DL (ref 0–199)
CHOLESTEROL/HDL RATIO: 3
CO2 SERPL-SCNC: 25 MMOL/L (ref 20–31)
CREAT SERPL-MCNC: 0.4 MG/DL (ref 0.5–0.9)
ERYTHROCYTE [DISTWIDTH] IN BLOOD BY AUTOMATED COUNT: 13.9 % (ref 12.5–15.4)
EST. AVERAGE GLUCOSE BLD GHB EST-MCNC: 120 MG/DL
GFR, ESTIMATED: >90 ML/MIN/1.73M2
GLUCOSE SERPL-MCNC: 105 MG/DL (ref 70–99)
HBA1C MFR BLD: 5.8 % (ref 4–6)
HCT VFR BLD AUTO: 36.1 % (ref 36–46)
HDLC SERPL-MCNC: 47 MG/DL
HGB BLD-MCNC: 11.9 G/DL (ref 12–16)
LDLC SERPL CALC-MCNC: 49 MG/DL (ref 0–100)
MCH RBC QN AUTO: 29.2 PG (ref 26–34)
MCHC RBC AUTO-ENTMCNC: 33.1 G/DL (ref 31–37)
MCV RBC AUTO: 88.2 FL (ref 80–100)
PLATELET # BLD AUTO: 379 K/UL (ref 140–450)
PMV BLD AUTO: 7.5 FL (ref 6–12)
POTASSIUM SERPL-SCNC: 3.9 MMOL/L (ref 3.7–5.3)
RBC # BLD AUTO: 4.09 M/UL (ref 4–5.2)
SODIUM SERPL-SCNC: 139 MMOL/L (ref 135–144)
TRIGL SERPL-MCNC: 145 MG/DL
VLDLC SERPL CALC-MCNC: 29 MG/DL
WBC OTHER # BLD: 5.6 K/UL (ref 3.5–11)

## 2024-06-23 PROCEDURE — 2500000003 HC RX 250 WO HCPCS: Performed by: PSYCHIATRY & NEUROLOGY

## 2024-06-23 PROCEDURE — 36415 COLL VENOUS BLD VENIPUNCTURE: CPT

## 2024-06-23 PROCEDURE — 80061 LIPID PANEL: CPT

## 2024-06-23 PROCEDURE — 95816 EEG AWAKE AND DROWSY: CPT | Performed by: PSYCHIATRY & NEUROLOGY

## 2024-06-23 PROCEDURE — 96365 THER/PROPH/DIAG IV INF INIT: CPT

## 2024-06-23 PROCEDURE — G0378 HOSPITAL OBSERVATION PER HR: HCPCS

## 2024-06-23 PROCEDURE — 6370000000 HC RX 637 (ALT 250 FOR IP)

## 2024-06-23 PROCEDURE — 6370000000 HC RX 637 (ALT 250 FOR IP): Performed by: PSYCHIATRY & NEUROLOGY

## 2024-06-23 PROCEDURE — 2580000003 HC RX 258: Performed by: INTERNAL MEDICINE

## 2024-06-23 PROCEDURE — 95816 EEG AWAKE AND DROWSY: CPT

## 2024-06-23 PROCEDURE — 97535 SELF CARE MNGMENT TRAINING: CPT

## 2024-06-23 PROCEDURE — 6370000000 HC RX 637 (ALT 250 FOR IP): Performed by: INTERNAL MEDICINE

## 2024-06-23 PROCEDURE — 97162 PT EVAL MOD COMPLEX 30 MIN: CPT

## 2024-06-23 PROCEDURE — 80048 BASIC METABOLIC PNL TOTAL CA: CPT

## 2024-06-23 PROCEDURE — 85027 COMPLETE CBC AUTOMATED: CPT

## 2024-06-23 PROCEDURE — 96372 THER/PROPH/DIAG INJ SC/IM: CPT

## 2024-06-23 PROCEDURE — 2580000003 HC RX 258: Performed by: PSYCHIATRY & NEUROLOGY

## 2024-06-23 PROCEDURE — 6360000002 HC RX W HCPCS: Performed by: INTERNAL MEDICINE

## 2024-06-23 PROCEDURE — 99232 SBSQ HOSP IP/OBS MODERATE 35: CPT | Performed by: INTERNAL MEDICINE

## 2024-06-23 PROCEDURE — 97166 OT EVAL MOD COMPLEX 45 MIN: CPT

## 2024-06-23 PROCEDURE — 97116 GAIT TRAINING THERAPY: CPT

## 2024-06-23 PROCEDURE — 99223 1ST HOSP IP/OBS HIGH 75: CPT | Performed by: PSYCHIATRY & NEUROLOGY

## 2024-06-23 PROCEDURE — 83036 HEMOGLOBIN GLYCOSYLATED A1C: CPT

## 2024-06-23 RX ORDER — FUROSEMIDE 20 MG/1
20 TABLET ORAL DAILY
Status: DISCONTINUED | OUTPATIENT
Start: 2024-06-23 | End: 2024-06-26 | Stop reason: HOSPADM

## 2024-06-23 RX ORDER — SERTRALINE HYDROCHLORIDE 25 MG/1
25 TABLET, FILM COATED ORAL DAILY
Status: DISCONTINUED | OUTPATIENT
Start: 2024-06-23 | End: 2024-06-26 | Stop reason: HOSPADM

## 2024-06-23 RX ADMIN — ENOXAPARIN SODIUM 40 MG: 100 INJECTION SUBCUTANEOUS at 09:18

## 2024-06-23 RX ADMIN — ASPIRIN 81 MG: 81 TABLET, COATED ORAL at 09:18

## 2024-06-23 RX ADMIN — SODIUM CHLORIDE: 9 INJECTION, SOLUTION INTRAVENOUS at 13:17

## 2024-06-23 RX ADMIN — PANTOPRAZOLE SODIUM 40 MG: 40 TABLET, DELAYED RELEASE ORAL at 17:02

## 2024-06-23 RX ADMIN — PANTOPRAZOLE SODIUM 40 MG: 40 TABLET, DELAYED RELEASE ORAL at 08:27

## 2024-06-23 RX ADMIN — SODIUM CHLORIDE, PRESERVATIVE FREE 10 ML: 5 INJECTION INTRAVENOUS at 21:07

## 2024-06-23 RX ADMIN — METOPROLOL TARTRATE 25 MG: 25 TABLET, FILM COATED ORAL at 21:06

## 2024-06-23 RX ADMIN — METOPROLOL SUCCINATE 50 MG: 50 TABLET, EXTENDED RELEASE ORAL at 09:48

## 2024-06-23 RX ADMIN — HYDROCHLOROTHIAZIDE 25 MG: 25 TABLET ORAL at 09:48

## 2024-06-23 RX ADMIN — VALPROATE SODIUM 500 MG: 100 INJECTION, SOLUTION INTRAVENOUS at 13:24

## 2024-06-23 RX ADMIN — SODIUM CHLORIDE, PRESERVATIVE FREE 10 ML: 5 INJECTION INTRAVENOUS at 09:48

## 2024-06-23 RX ADMIN — SERTRALINE HYDROCHLORIDE 25 MG: 25 TABLET ORAL at 21:06

## 2024-06-23 RX ADMIN — Medication 5000 UNITS: at 09:18

## 2024-06-23 RX ADMIN — LOSARTAN POTASSIUM 25 MG: 25 TABLET, FILM COATED ORAL at 09:48

## 2024-06-23 RX ADMIN — FUROSEMIDE 20 MG: 20 TABLET ORAL at 21:06

## 2024-06-23 ASSESSMENT — PAIN SCALES - GENERAL
PAINLEVEL_OUTOF10: 0

## 2024-06-23 NOTE — PLAN OF CARE
Problem: Discharge Planning  Goal: Discharge to home or other facility with appropriate resources  6/23/2024 1503 by Rosina Garzon RN  Outcome: Progressing  Flowsheets (Taken 6/23/2024 0805)  Discharge to home or other facility with appropriate resources:   Identify barriers to discharge with patient and caregiver   Arrange for needed discharge resources and transportation as appropriate   Identify discharge learning needs (meds, wound care, etc)  6/23/2024 0218 by Aiyana Pierce RN  Outcome: Progressing  Flowsheets (Taken 6/23/2024 0218)  Discharge to home or other facility with appropriate resources:   Identify barriers to discharge with patient and caregiver   Identify discharge learning needs (meds, wound care, etc)   Refer to discharge planning if patient needs post-hospital services based on physician order or complex needs related to functional status, cognitive ability or social support system   Arrange for needed discharge resources and transportation as appropriate   Arrange for interpreters to assist at discharge as needed     Problem: Pain  Goal: Verbalizes/displays adequate comfort level or baseline comfort level  6/23/2024 1503 by Rosina Garzon RN  Outcome: Progressing  6/23/2024 0218 by Aiyana Pierce RN  Outcome: Progressing  Flowsheets (Taken 6/23/2024 0218)  Verbalizes/displays adequate comfort level or baseline comfort level:   Encourage patient to monitor pain and request assistance   Administer analgesics based on type and severity of pain and evaluate response   Consider cultural and social influences on pain and pain management   Assess pain using appropriate pain scale   Implement non-pharmacological measures as appropriate and evaluate response   Notify Licensed Independent Practitioner if interventions unsuccessful or patient reports new pain     Problem: Safety - Adult  Goal: Free from fall injury  6/23/2024 1503 by Rosina Garzon RN  Outcome: Progressing  Flowsheets

## 2024-06-23 NOTE — ED PROVIDER NOTES
Emergency Department     Faculty Attestation    I performed a history and physical examination of the patient and discussed management with the mid level provider. I reviewed the mid level provider's note and agree with the documented findings and plan of care. Any areas of disagreement are noted on the chart. I was personally present for the key portions of any procedures. I have documented in the chart those procedures where I was not present during the key portions. I have reviewed the emergency nurses triage note. I agree with the chief complaint, past medical history, past surgical history, allergies, medications, social and family history as documented unless otherwise noted below. Documentation of the HPI, Physical Exam and Medical Decision Making performed by medical students or scribes is based on my personal performance of the HPI, PE and MDM. For Physician Assistant/ Nurse Practitioner cases/documentation I have personally evaluated this patient and have completed at least one if not all key elements of the E/M (history, physical exam, and MDM). Additional findings are as noted.      Primary Care Physician:  Meg Valero MD    CHIEF COMPLAINT       Chief Complaint   Patient presents with    Fatigue       RECENT VITALS:   Temp: 98.6 °F (37 °C),  Pulse: 82, Respirations: 17, BP: (!) 182/92 (scheduled med given)    LABS:  Labs Reviewed   CBC WITH AUTO DIFFERENTIAL - Abnormal; Notable for the following components:       Result Value    Hemoglobin 11.7 (*)     Lymphocytes % 23 (*)     Monocytes % 13 (*)     Eosinophils % 6 (*)     All other components within normal limits   CBC - Abnormal; Notable for the following components:    Hemoglobin 11.9 (*)     All other components within normal limits   BASIC METABOLIC PANEL W/ REFLEX TO MG FOR LOW K - Abnormal; Notable for the following components:    Glucose 105 (*)     Creatinine 0.4 (*)     All other components within normal limits   BASIC METABOLIC  PANEL   TROPONIN   URINALYSIS WITH REFLEX TO CULTURE   TSH WITH REFLEX   LIPID PANEL   HEMOGLOBIN A1C         PERTINENT ATTENDING PHYSICIAN COMMENTS:    78-year-old female presented here via EMS after she was found to be apparently confused by a neighbor.  She underwent EGD at [facility] yesterday, seemed to tolerate this well and without complication.  Here, she seems very confused about the details of all of this, although she is able to tell me that she was outside sweeping her porch when her neighbor came over, which is consistent with the stated history.  However, she does appear to be acutely confused about other details about recent events and seems to have a hard time maintaining attention.  I do not see any focal neurological deficits on exam, NIHSS 0.  A metabolic encephalopathy is our initial suspicion, however no significant laboratory abnormalities to go along with this.  On noncontrast CT of the brain, there is concern for an increased hypodensity along the bilateral basal ganglia potentially indicating acute ischemia.  We consulted with neurology and will obtain CTA of the head and neck.  Will admit and plan for MRI.         Anatoly Nair MD  06/23/24 4619

## 2024-06-23 NOTE — PROGRESS NOTES
Physical Therapy  Facility/Department: [address]  Physical Therapy Initial Assessment    Name: Sruthi Sandhu  : 1945  MRN: [id number]  Date of Service: 2024    Chief Complaint   Patient presents with    Fatigue      Discharge Recommendations:  Patient would benefit from continued therapy after discharge   PT Equipment Recommendations  Equipment Needed: No (rolling walker and cane at home)      Patient Diagnosis(es): The encounter diagnosis was Cerebrovascular accident (CVA), unspecified mechanism (HCC).  Past Medical History:  has a past medical history of Acute dermatitis, Coronary atherosclerosis of native coronary artery, Hyperlipemia, Hypertension, and Metabolic syndrome.  Past Surgical History:  has a past surgical history that includes Coronary angioplasty with stent () and Cataract removal.    Assessment   Body Structures, Functions, Activity Limitations Requiring Skilled Therapeutic Intervention: Decreased functional mobility ;Decreased cognition;Decreased balance;Decreased safe awareness    Assessment: Pt presents from home alone with increasing weakness, ? CVA. At baseline, pt independent gait no device, ind ADL's, drives and does yoga. Pt currently requiring SBA for bed mobility, SBA transfers, pt ambulated 20ft with HHA and min assist x 1 due to pt reaching for external objects/furniture to steady self. Pt also ambulated 90ft with rolling walker and CGA. Pt does not demonstrate adequate safety for return to prior independent living situation. Pt will benefit from continued skilled PT for safety, balance and functional mobility training while in the hospital and at discharge.    Further therapy recommended at discharge.The patient should be able to tolerate at least 3 hours of therapy per day over 5 days or 15 hours over 7 days.   This patient may benefit from a Physical Medicine and Rehab consult.      Therapy Prognosis: Good    Decision Making: Medium Complexity    Requires PT

## 2024-06-23 NOTE — PROGRESS NOTES
Occupational Therapy  Facility/Department: [address]  Occupational Therapy Initial Assessment    Name: Sruthi Sandhu  : 1945  MRN: [id number]  Date of Service: 2024    Discharge Recommendations:  Patient would benefit from continued therapy after discharge  OT Equipment Recommendations  Other: TBD  Further therapy recommended at discharge.The patient should be able to tolerate at least 3 hours of therapy per day over 5 days or 15 hours over 7 days.   This patient may benefit from a Physical Medicine and Rehab consult.       Chief Complaint   Patient presents with    Fatigue      Patient Diagnosis(es): The encounter diagnosis was Cerebrovascular accident (CVA), unspecified mechanism (HCC).  Past Medical History:  has a past medical history of Acute dermatitis, Coronary atherosclerosis of native coronary artery, Hyperlipemia, Hypertension, and Metabolic syndrome.  Past Surgical History:  has a past surgical history that includes Coronary angioplasty with stent () and Cataract removal.           Assessment   Performance deficits / Impairments: Decreased functional mobility ;Decreased ADL status;Decreased balance  Assessment: Patient demonstrated decreased ADLs, balance and functional mobility following hospitalization due to fatigue and weakness, ? acute CVA B basal ganglia. Patient would benefit from skilled OT services addressing above deficits while here at hospital and following discharge to maximize independence in prep for eventual return home. Patient currently unsafe to return to prior living arrangements.  Prognosis: Good  Decision Making: Medium Complexity  REQUIRES OT FOLLOW-UP: Yes  Activity Tolerance  Activity Tolerance: Patient limited by fatigue;Treatment limited secondary to decreased cognition        Plan   Occupational Therapy Plan  Times Per Week: 5-6x/wk  Current Treatment Recommendations: Balance training, Functional mobility training, Safety education & training,

## 2024-06-23 NOTE — CONSULTS
NEUROLOGY INPATIENT CONSULT NOTE    6/23/2024         Sruthi Sandhu is a  78 y.o. female admitted on 6/22/2024 with  Generalized weakness [R53.1]  Cerebrovascular accident (CVA), unspecified mechanism (HCC) [I63.9]    Reason for consult: Generalized weakness and abnormal head CT  History is obtained mostly from the patient and the medical record and from the caregivers. Chart is reviewed and patient is examined.   Briefly, this is a  78 y.o. female with hx of HTN, HLD was admitted on 6/22/2024 with c/o generalized weakness \"for couple of days\".  Patient is unable to provide much of the history.  Patient admits to anxiety/ depression stating \"taking Zoloft\".   Patient states she is feeling weak because \"multiple people are dying\".  Patient has not had any recent loss in her life however just feels sad about the thought of \"father's dying\".  Reportedly patient's neighbor called EMS as she found patient \"not acting herself\".   Vitals on admit 149/75, 59/min, 98.4 °F.  CT head demonstrating increased hypodensity along bilateral basal ganglia, right greater than left, question acute ischemia.  CTA head and neck with no flow-limiting stenosis.  Hypoplastic right vertebral artery.  Evaluated by stroke team on admit; felt not a candidate for IV thrombolytics.     Called and spoke to patient's daughter, Ms Rhodes at [phone] over phone for > 15 minutes.   As per patient's daughter; patient has had open heart surgery in March 2024 and afterwards patient has had seizure activity x 2 on March 29, 2024.  At that time patient was noted to be having GI bleed patient was admitted to [facility] at that time.  Patient was evaluated by gastroenterology and has had endoscopy done revealing nonbleeding gastric ulcers.  She also underwent cauterization and blood transfusions.  As per patient's daughter; patient has been feeling exhausted and tired since Thursday, 6/20 and on Friday she seems to be confused.  Patient's  itching   Endo/heme/allergies Negative for polydipsia, environmental allergy   Psychiatric Negative for suicidal ideation.  Patient is not anxious             Objective:   /61   Pulse 65   Temp 98.2 °F (36.8 °C) (Oral)   Resp 16   Ht 1.524 m (5')   Wt 63 kg (138 lb 14.2 oz)   SpO2 94%   BMI 27.13 kg/m²     Blood pressure range: Systolic (24hrs), Av , Min:125 , Max:149   ; Diastolic (24hrs), Av, Min:61, Max:80      Continuous infusions:    sodium chloride         ON EXAMINATION:  GENERAL  Appears comfortable and in no distress   HEENT  NC/ AT   NECK  Supple and no bruits heard   Cardiovascular  S1, S2 heard; radial pulse intact   MENTAL STATUS:  Alert, oriented x self and place but not to the month and year.  With multiple cues; she was able to recall the month.  Good naming and good repetition.  Followed three-step commands well.  No dysarthria noted.  Poor immediate recall.  Intact long-term memory.  No dysarthria noted.  No hallucinations or delusions.   CRANIAL NERVES: II     -       Pupils reactive b/l., Fundus exam: intact venous pulsations; Visual fields intact to confrontation  III,IV,VI -  EOMs full, no afferent defect, no                      MAYURI, no ptosis  V     -     Normal facial sensation  VII    -     Normal facial symmetry  VIII   -     Intact hearing  IX,X -     Symmetrical palate  XI    -     Symmetrical shoulder shrug  XII   -     Midline tongue, no atrophy    MOTOR FUNCTION: Motor exam reveals 4+/5 MRC in both upper and lower extremities.  Able to move all extremities against gravity and resistance equally well.  Deep tendon reflexes are 1+ and symmetric.  No pronator drift noted.  No Babinski sign noted.  Normal bulk and normal tone.  No abnormal involuntary movements noted.  No tremors noted.   SENSORY FUNCTION:  Normal touch, normal pin, normal vibration in all 4 extre   CEREBELLAR FUNCTION:  Intact fine motor control over upper limbs   STATION and GAIT  Attempted but she

## 2024-06-23 NOTE — CARE COORDINATION
Case Management Assessment  Initial Evaluation    Date/Time of Evaluation: 6/23/2024 2:07 PM  Assessment Completed by: CARLOS Johnson, LSW    If patient is discharged prior to next notation, then this note serves as note for discharge by case management.    Patient Name: Sruthi Sandhu                   YOB: 1945  Diagnosis: Generalized weakness [R53.1]  Cerebrovascular accident (CVA), unspecified mechanism (HCC) [I63.9]                   Date / Time: 6/22/2024  1:42 PM    Patient Admission Status: Observation   Readmission Risk (Low < 19, Mod (19-27), High > 27): No data recorded  Current PCP: Meg Valero MD  PCP verified by CM? Yes    Chart Reviewed: Yes      History Provided by: Patient  Patient Orientation: Alert and Oriented    Patient Cognition: Alert    Hospitalization in the last 30 days (Readmission):  No    If yes, Readmission Assessment in  Navigator will be completed.    Advance Directives:      Code Status: Full Code   Patient's Primary Decision Maker is: Legal Next of Kin      Discharge Planning:    Patient lives with: Alone Type of Home: House  Primary Care Giver: Self  Patient Support Systems include: Children   Current Financial resources: Medicare  Current community resources:    Current services prior to admission: None            Current DME:              Type of Home Care services:  None    ADLS  Prior functional level: Independent in ADLs/IADLs, Bathing, Dressing, Toileting, Feeding, Cooking, Housework, Shopping, Mobility  Current functional level: Assistance with the following:, Bathing, Dressing, Toileting, Cooking, Housework, Shopping, Mobility    PT AM-PAC: 18 /24  OT AM-PAC: 19 /24    Family can provide assistance at DC: Other (comment) (will speak with family to see what support can be provided)  Would you like Case Management to discuss the discharge plan with any other family members/significant others, and if so, who? Yes  Plans to Return to Present

## 2024-06-23 NOTE — PLAN OF CARE
Problem: Discharge Planning  Goal: Discharge to home or other facility with appropriate resources  Outcome: Progressing  Flowsheets (Taken 6/23/2024 0218)  Discharge to home or other facility with appropriate resources:   Identify barriers to discharge with patient and caregiver   Identify discharge learning needs (meds, wound care, etc)   Refer to discharge planning if patient needs post-hospital services based on physician order or complex needs related to functional status, cognitive ability or social support system   Arrange for needed discharge resources and transportation as appropriate   Arrange for interpreters to assist at discharge as needed     Problem: Pain  Goal: Verbalizes/displays adequate comfort level or baseline comfort level  Outcome: Progressing  Flowsheets (Taken 6/23/2024 0218)  Verbalizes/displays adequate comfort level or baseline comfort level:   Encourage patient to monitor pain and request assistance   Administer analgesics based on type and severity of pain and evaluate response   Consider cultural and social influences on pain and pain management   Assess pain using appropriate pain scale   Implement non-pharmacological measures as appropriate and evaluate response   Notify Licensed Independent Practitioner if interventions unsuccessful or patient reports new pain     Problem: Safety - Adult  Goal: Free from fall injury  Outcome: Progressing  Flowsheets (Taken 6/23/2024 0218)  Free From Fall Injury: Instruct family/caregiver on patient safety     Problem: Neurosensory - Adult  Goal: Achieves maximal functionality and self care  Outcome: Progressing  Flowsheets (Taken 6/23/2024 0218)  Achieves maximal functionality and self care:   Monitor swallowing and airway patency with patient fatigue and changes in neurological status   Encourage and assist patient to increase activity and self care with guidance from physical therapy/occupational therapy   Encourage visually impaired, hearing  impaired and aphasic patients to use assistive/communication devices

## 2024-06-23 NOTE — PROGRESS NOTES
[facility]  Office: [phone]  Michael Bravo DO, Jules Jeffries DO, Yoel Jackson DO, Harvey Garcia, DO, Omer Valleoj MD, Randa Mireles MD, Mary Gill MD, Lynette Otto MD,  Erasmo Dowd MD, Fidelina Gonzales MD, Anita Angelo MD,  Karli Shore DO, Andreea Stover MD, Naga Giron MD, Hamilton Bravo DO, Ambreen Reid MD,  Ever Stevens DO, Paulina Martinez MD, Michelle Estrada MD, Magdalene Miner MD, Aldair Appiah MD,  José Antonio Eckert MD, Jo Ann Pyle MD, Trent Cochran MD, Nelson Duque MD, Ap Diehl MD, Lillie Pelletier MD, Florencio Dorman DO, Caleb Prakash DO, Melisa Diaz MD,  Stephan Rodríguez MD, Shirley Waterhouse, CNP,  Kay Ricardo CNP, Anatoly Madrigal, CNP,  Rosina Orellana, DNP, Bhavna Andrade, CNP, Brianna Lopez, CNP, Mercedes Ford, CNP, Angy Enrique, CNP, Luzmaria Moreland, PA-C, Mayda Jones PA-C, Tiffany Alcazar, CNP, Cande Sandra, CNP, Demetra Smith, CNP, Chrystal Marie, CNP, Linda Chirinos, CNP, Deepika Bradshaw, CNS, Cielo Licea, CNP, Kelly Espino CNP, Tracy Schwab, CNP         [facility]   IN-PATIENT SERVICE   [facility]    Progress Note    6/23/2024    6:15 PM    Name:   Sruthi Sandhu  MRN:     [id number]     Acct:      [id number]   Room:   333/333-01   Day:  0  Admit Date:  6/22/2024  1:42 PM    PCP:   Meg Valero MD  Code Status:  Full Code    Subjective:     C/C:   Chief Complaint   Patient presents with    Fatigue     Interval History Status: not changed.     Patient is sitting in chair talking to her friend.  She is very flat and doesn't have any facial expression.  She answers some questions appropriately, but is confused at other times.  She had difficulty with OT in the bathroom with coordination.      Pt denies any HA, slurred speech or facial droop.  She knows her name, thinks she's at [facility] and doesn't know the date. She thinks it's August.  Her friend, Lucila, is visiting and states that  crackles bibasilar  GERD-on PPI twice daily  History of GI bleed- post CABG  Major depression-continue Zoloft  DVT prophylaxis-Lovenox  PT OT  Social work-may need placement as patient lives alone    Randa Mireles MD  6/23/2024  6:15 PM

## 2024-06-23 NOTE — PROCEDURES
PROCEDURE NOTE  Date: 6/23/2024   Name: Sruthi Sandhu  YOB: 1945    Procedures    EEG REPORT       Patient: Sruthi Sandhu Age: 78 y.o.  MRN: [id number]      Referring Provider: No ref. provider found    History: This routine 30 minute scalp EEG was recorded with video- monitoring for a 78 y.o.. female who presented with encephalopathy. This EEG was performed to evaluate for focal and epileptiform abnormalities.     Sruthi Sandhu   Current Facility-Administered Medications   Medication Dose Route Frequency Provider Last Rate Last Admin    sertraline (ZOLOFT) tablet 25 mg  25 mg Oral Daily Vivienne Hutchison MD        valproate (DEPACON) 500 mg in sodium chloride 0.9 % 100 mL IVPB  500 mg IntraVENous Q12H Vivienne Hutchison MD   Stopped at 06/23/24 1428    sodium chloride 0.9 % bolus 80 mL  80 mL IntraVENous Once Randa Mireles MD        aspirin EC tablet 81 mg  81 mg Oral Daily Randa Mireles MD   81 mg at 06/23/24 0918    hypromellose (ISOPTO TEARS) 0.5 % ophthalmic solution 1 drop  1 drop Both Eyes PRN Randa Mireles MD        Vitamin D (CHOLECALCIFEROL) tablet 5,000 Units  5,000 Units Oral Daily Randa Mireles MD   5,000 Units at 06/23/24 0918    sodium chloride flush 0.9 % injection 10 mL  10 mL IntraVENous 2 times per day Randa Mireles MD   10 mL at 06/23/24 0948    sodium chloride flush 0.9 % injection 10 mL  10 mL IntraVENous PRN Randa Mireles MD        0.9 % sodium chloride infusion   IntraVENous PRN Randa Mireles MD   Stopped at 06/23/24 1432    ondansetron (ZOFRAN-ODT) disintegrating tablet 4 mg  4 mg Oral Q8H PRN Randa Mireles MD        Or    ondansetron (ZOFRAN) injection 4 mg  4 mg IntraVENous Q6H PRN Randa Mireles MD        magnesium hydroxide (MILK OF MAGNESIA) 400 MG/5ML suspension 30 mL  30 mL Oral Daily PRN Randa Mireles MD        enoxaparin (LOVENOX) injection 40 mg  40 mg SubCUTAneous Daily Randa Mireles MD   40 mg at 06/23/24

## 2024-06-24 ENCOUNTER — APPOINTMENT (OUTPATIENT)
Dept: MRI IMAGING | Age: 79
DRG: 066 | End: 2024-06-24
Payer: MEDICARE

## 2024-06-24 LAB
ALLEN TEST: POSITIVE
AMPHET UR QL SCN: NEGATIVE
BARBITURATES UR QL SCN: NEGATIVE
BENZODIAZ UR QL: NEGATIVE
CANNABINOIDS UR QL SCN: NEGATIVE
CERULOPLASMIN SERPL-MCNC: 27 MG/DL (ref 16–45)
COCAINE UR QL SCN: NEGATIVE
EKG ATRIAL RATE: 59 BPM
EKG P AXIS: 67 DEGREES
EKG P-R INTERVAL: 234 MS
EKG Q-T INTERVAL: 450 MS
EKG QRS DURATION: 86 MS
EKG QTC CALCULATION (BAZETT): 445 MS
EKG R AXIS: 63 DEGREES
EKG T AXIS: 76 DEGREES
EKG VENTRICULAR RATE: 59 BPM
FENTANYL UR QL: NEGATIVE
METHADONE UR QL: NEGATIVE
O2 DELIVERY DEVICE: ABNORMAL
OPIATES UR QL SCN: NEGATIVE
OXYCODONE UR QL SCN: NEGATIVE
PCP UR QL SCN: NEGATIVE
POC HCO3: 22.9 MMOL/L (ref 21–28)
POC O2 SATURATION: 96.7 % (ref 94–98)
POC PCO2: 31.6 MM HG (ref 35–48)
POC PH: 7.47 (ref 7.35–7.45)
POC PO2: 80.4 MM HG (ref 83–108)
POSITIVE BASE EXCESS, ART: 0 MMOL/L (ref 0–3)
SAMPLE SITE: ABNORMAL
TEST INFORMATION: NORMAL

## 2024-06-24 PROCEDURE — 2580000003 HC RX 258: Performed by: INTERNAL MEDICINE

## 2024-06-24 PROCEDURE — 97535 SELF CARE MNGMENT TRAINING: CPT

## 2024-06-24 PROCEDURE — 2500000003 HC RX 250 WO HCPCS: Performed by: PSYCHIATRY & NEUROLOGY

## 2024-06-24 PROCEDURE — 36415 COLL VENOUS BLD VENIPUNCTURE: CPT

## 2024-06-24 PROCEDURE — 96366 THER/PROPH/DIAG IV INF ADDON: CPT

## 2024-06-24 PROCEDURE — 70552 MRI BRAIN STEM W/DYE: CPT

## 2024-06-24 PROCEDURE — 36600 WITHDRAWAL OF ARTERIAL BLOOD: CPT

## 2024-06-24 PROCEDURE — 6360000004 HC RX CONTRAST MEDICATION: Performed by: PSYCHIATRY & NEUROLOGY

## 2024-06-24 PROCEDURE — 6370000000 HC RX 637 (ALT 250 FOR IP): Performed by: INTERNAL MEDICINE

## 2024-06-24 PROCEDURE — 6370000000 HC RX 637 (ALT 250 FOR IP)

## 2024-06-24 PROCEDURE — 80165 DIPROPYLACETIC ACID FREE: CPT

## 2024-06-24 PROCEDURE — 99232 SBSQ HOSP IP/OBS MODERATE 35: CPT | Performed by: INTERNAL MEDICINE

## 2024-06-24 PROCEDURE — A9579 GAD-BASE MR CONTRAST NOS,1ML: HCPCS | Performed by: PSYCHIATRY & NEUROLOGY

## 2024-06-24 PROCEDURE — 99232 SBSQ HOSP IP/OBS MODERATE 35: CPT | Performed by: PSYCHIATRY & NEUROLOGY

## 2024-06-24 PROCEDURE — 82390 ASSAY OF CERULOPLASMIN: CPT

## 2024-06-24 PROCEDURE — 2580000003 HC RX 258: Performed by: PSYCHIATRY & NEUROLOGY

## 2024-06-24 PROCEDURE — 80307 DRUG TEST PRSMV CHEM ANLYZR: CPT

## 2024-06-24 PROCEDURE — 96372 THER/PROPH/DIAG INJ SC/IM: CPT

## 2024-06-24 PROCEDURE — 97116 GAIT TRAINING THERAPY: CPT

## 2024-06-24 PROCEDURE — G0378 HOSPITAL OBSERVATION PER HR: HCPCS

## 2024-06-24 PROCEDURE — 6370000000 HC RX 637 (ALT 250 FOR IP): Performed by: PSYCHIATRY & NEUROLOGY

## 2024-06-24 PROCEDURE — 82803 BLOOD GASES ANY COMBINATION: CPT

## 2024-06-24 PROCEDURE — 80164 ASSAY DIPROPYLACETIC ACD TOT: CPT

## 2024-06-24 PROCEDURE — 97110 THERAPEUTIC EXERCISES: CPT

## 2024-06-24 PROCEDURE — 6360000002 HC RX W HCPCS: Performed by: INTERNAL MEDICINE

## 2024-06-24 PROCEDURE — 70551 MRI BRAIN STEM W/O DYE: CPT

## 2024-06-24 RX ORDER — SODIUM CHLORIDE 0.9 % (FLUSH) 0.9 %
10 SYRINGE (ML) INJECTION ONCE
Status: COMPLETED | OUTPATIENT
Start: 2024-06-24 | End: 2024-06-24

## 2024-06-24 RX ADMIN — PANTOPRAZOLE SODIUM 40 MG: 40 TABLET, DELAYED RELEASE ORAL at 08:24

## 2024-06-24 RX ADMIN — METOPROLOL TARTRATE 25 MG: 25 TABLET, FILM COATED ORAL at 20:54

## 2024-06-24 RX ADMIN — SERTRALINE HYDROCHLORIDE 25 MG: 25 TABLET ORAL at 08:24

## 2024-06-24 RX ADMIN — VALPROATE SODIUM 500 MG: 100 INJECTION, SOLUTION INTRAVENOUS at 13:28

## 2024-06-24 RX ADMIN — SODIUM CHLORIDE, PRESERVATIVE FREE 10 ML: 5 INJECTION INTRAVENOUS at 20:55

## 2024-06-24 RX ADMIN — ASPIRIN 81 MG: 81 TABLET, COATED ORAL at 08:24

## 2024-06-24 RX ADMIN — ENOXAPARIN SODIUM 40 MG: 100 INJECTION SUBCUTANEOUS at 08:25

## 2024-06-24 RX ADMIN — SODIUM CHLORIDE, PRESERVATIVE FREE 10 ML: 5 INJECTION INTRAVENOUS at 08:24

## 2024-06-24 RX ADMIN — LOSARTAN POTASSIUM 25 MG: 25 TABLET, FILM COATED ORAL at 08:25

## 2024-06-24 RX ADMIN — PANTOPRAZOLE SODIUM 40 MG: 40 TABLET, DELAYED RELEASE ORAL at 18:40

## 2024-06-24 RX ADMIN — VALPROATE SODIUM 500 MG: 100 INJECTION, SOLUTION INTRAVENOUS at 00:20

## 2024-06-24 RX ADMIN — METOPROLOL TARTRATE 25 MG: 25 TABLET, FILM COATED ORAL at 08:25

## 2024-06-24 RX ADMIN — Medication 5000 UNITS: at 08:25

## 2024-06-24 RX ADMIN — GADOTERIDOL 13 ML: 279.3 INJECTION, SOLUTION INTRAVENOUS at 15:24

## 2024-06-24 RX ADMIN — FUROSEMIDE 20 MG: 20 TABLET ORAL at 08:24

## 2024-06-24 RX ADMIN — SODIUM CHLORIDE, PRESERVATIVE FREE 10 ML: 5 INJECTION INTRAVENOUS at 15:24

## 2024-06-24 ASSESSMENT — PAIN SCALES - GENERAL: PAINLEVEL_OUTOF10: 0

## 2024-06-24 NOTE — PROGRESS NOTES
Updated Dr. Mireles and Dr. Lucia via perfect serve of MRI result: Foci of acute/subacute ischemia involving the globus pallidus bilaterally of  uncertain etiology.  Underlying toxic or metabolic processes are  consideration and correlation is needed. Underlying chronic microvascular disease.    Neurology to take a look per Dr. Lucia.

## 2024-06-24 NOTE — PROGRESS NOTES
Occupational Therapy  Facility/Department: [address]  Occupational Therapy Daily Treatment Note    Name: Sruthi Sandhu  : 1945  MRN: [id number]  Date of Service: 2024    Discharge Recommendations:  Patient would benefit from continued therapy after discharge  OT Equipment Recommendations  Other: TBD     Patient Diagnosis(es): The encounter diagnosis was Cerebrovascular accident (CVA), unspecified mechanism (HCC).  Past Medical History:  has a past medical history of Acute dermatitis, Coronary atherosclerosis of native coronary artery, Hyperlipemia, Hypertension, and Metabolic syndrome.  Past Surgical History:  has a past surgical history that includes Coronary angioplasty with stent () and Cataract removal.    Assessment   Performance deficits / Impairments: Decreased functional mobility ;Decreased ADL status;Decreased balance    Assessment: Patient demonstrated decreased ADLs, balance and functional mobility following hospitalization due to fatigue and weakness, ? acute CVA B basal ganglia. Patient would benefit from skilled OT services addressing above deficits while here at hospital and following discharge to maximize independence in prep for eventual return home. Patient currently unsafe to return to prior living arrangements.    Prognosis: Good  REQUIRES OT FOLLOW-UP: Yes  Activity Tolerance  Activity Tolerance: Patient Tolerated treatment well        Plan   Occupational Therapy Plan  Times Per Week: 5-6x/wk  Current Treatment Recommendations: Balance training, Functional mobility training, Safety education & training, Patient/Caregiver education & training, Equipment evaluation, education, & procurement, Self-Care / ADL, Neuromuscular re-education     Restrictions  Restrictions/Precautions  Restrictions/Precautions: Fall Risk, Bed Alarm  Required Braces or Orthoses?: No  Position Activity Restriction  Other position/activity restrictions: \"up with assistance\", flat affect, slow to

## 2024-06-24 NOTE — PROGRESS NOTES
[facility]  Office: [phone]  Michael Bravo DO, Jules Jeffries, DO, Yoel Jackson DO, Harvey Garcia, DO, Omer Vallejo MD, Randa Mireles MD, Mary Gill MD, Lynette Otto MD,  Erasmo Dowd MD, Fidelina Gonzales MD, Anita Angelo MD,  Karli Shore DO, Andreea Stover MD, Naga Giron MD, Hamilton Bravo DO, Ambreen Reid MD,  Ever Stevens DO, Paulina Martinez MD, Michelle Estrada MD, Magdalene Miner MD, Aldair Appiah MD,  José Antonio Eckert MD, Jo Ann Pyel MD, Trent Cochran MD, Nelson Duque MD, Ap Diehl MD, Lillie Pelletier MD, Florencio Dorman DO, Caleb Prakash DO, Melisa Diaz MD,  Stephan Rodríguez MD, Shirley Waterhouse, CNP,  Kay Ricardo CNP, Anatoly Madrigal, CNP,  Rosina Orellana, DNP, Bhavna Andrade, CNP, Brianna Lopez, CNP, Mercedes Ford, CNP, Angy Enrique, CNP, Luzmaria Moreland, PA-C, Mayda Jones, PA-C, Tiffany Alcazar, CNP, Cande Sandra, CNP, Demetra Smith, CNP, Chrystal Marie, CNP, Linda Chirinos, CNP, Deepika Bradshaw, CNS, Cielo Licea, CNP, Kelly Espino CNP, Tracy Schwab, CNP         [facility]   IN-PATIENT SERVICE   [facility]    Progress Note    6/24/2024    8:32 AM    Name:   Sruthi Sandhu  MRN:     [id number]     Acct:      [id number]   Room:   333/333-01   Day:  0  Admit Date:  6/22/2024  1:42 PM    PCP:   Meg Valero MD  Code Status:  Full Code    Subjective:     C/C:   Chief Complaint   Patient presents with    Fatigue     Interval History Status: not changed.     Patient is up walking with PT/OT.   She is more coherent today but still  having some confusion.  She is waiting to get her MRI brain.    She was at [facility] last week for an EGD on 6/21.  She had a GI bleed in March 1 week after her CABG and was found to have gastric ulcers.  This was a repeat EGD       Findings:  The duodenal bulb and second portion of the duodenum were normal.  Patchy minimal inflammation characterized by congestion

## 2024-06-24 NOTE — DISCHARGE INSTR - COC
Continuity of Care Form    Patient Name: Sruthi Sandhu   :  1945  MRN:  [id number]    Admit date:  2024  Discharge date:  24    Code Status Order: Full Code   Advance Directives:     Admitting Physician:  Randa Mireles MD  PCP: Meg Valero MD    Discharging Nurse: COLLINS Parson RN  Discharging Hospital Unit/Room#: 333/333-01  Discharging Unit Phone Number: [phone]    Emergency Contact:   Extended Emergency Contact Information  Primary Emergency Contact: Meagan Galvez  Address: [id number] [place] [address] [id number] [place] [address]  Home Phone: [phone]  Work Phone: [phone]  Mobile Phone: [phone]  Relation: Emergency Contact  Secondary Emergency Contact: SAM HIRSCH  Mobile Phone: [phone]  Relation: Other    Past Surgical History:  Past Surgical History:   Procedure Laterality Date    CATARACT REMOVAL      CORONARY ANGIOPLASTY WITH STENT PLACEMENT  2004    DEWEY x 2 RCA and DCS LCx x 1.  occl distal LAD.        Immunization History:   Immunization History   Administered Date(s) Administered    COVID-19, PFIZER Bivalent, DO NOT Dilute, (age 12y+), IM, 30 mcg/0.3 mL 2023    COVID-19, PFIZER GRAY top, DO NOT Dilute, (age 12 y+), IM, 30 mcg/0.3 mL 2022    COVID-19, PFIZER PURPLE top, DILUTE for use, (age 12 y+), 30mcg/0.3mL 2021, 03/15/2021, 10/06/2021       Active Problems:  Patient Active Problem List   Diagnosis Code    Coronary artery disease involving native coronary artery of native heart without angina pectoris I25.10    Essential hypertension I10    Hyperlipidemia E78.5    Statin intolerance Z78.9    Syncope R55    SVT (supraventricular tachycardia) ([facility]) I47.10    Hypokalemia E87.6    Unstable angina ([facility]) I20.0    History of PSVT (paroxysmal supraventricular tachycardia) Z86.79    Palpitations R00.2    Generalized weakness R53.1    S/P CABG x 3 Z95.1    Gastroesophageal reflux  disease without esophagitis K21.9    Moderate episode of recurrent major depressive disorder (HCC) F33.1    Stroke-like symptoms R29.90    Cerebrovascular accident (CVA) ([facility]) I63.9       Isolation/Infection:   Isolation            No Isolation          Patient Infection Status       None to display                     Nurse Assessment:  Last Vital Signs: /77   Pulse 62   Temp 97.7 °F (36.5 °C) (Oral)   Resp 18   Ht 1.524 m (5')   Wt 63 kg (138 lb 14.2 oz)   SpO2 96%   BMI 27.13 kg/m²     Last documented pain score (0-10 scale): Pain Level: 0  Last Weight:   Wt Readings from Last 1 Encounters:   06/22/24 63 kg (138 lb 14.2 oz)     Mental Status:  oriented and alert    IV Access:  - None    Nursing Mobility/ADLs:  Walking   Assisted   Transfer  Assisted  Bathing  Assisted  Dressing  Independent  Toileting  Assisted  Feeding  Independent  Med Admin  Assisted  Med Delivery   whole    Wound Care Documentation and Therapy:        Elimination:  Continence:   Bowel: Yes  Bladder: Yes  Urinary Catheter: None   Colostomy/Ileostomy/Ileal Conduit: No       Date of Last BM: 6/23/24    Intake/Output Summary (Last 24 hours) at 6/24/2024 1319  Last data filed at 6/24/2024 0756  Gross per 24 hour   Intake 801.13 ml   Output 400 ml   Net 401.13 ml     I/O last 3 completed shifts:  In: 871.1 [P.O.:660; I.V.:11.1; IV Piggyback:200]  Out: 1550 [Urine:1550]    Safety Concerns:     History of Seizures    Impairments/Disabilities:      None    Nutrition Therapy:  Current Nutrition Therapy:   Cardiac Diet    Routes of Feeding: Oral  Liquids: No Restrictions  Daily Fluid Restriction: no  Last Modified Barium Swallow with Video (Video Swallowing Test): not done    Treatments at the Time of Hospital Discharge:   Respiratory Treatments: n/a  Oxygen Therapy:  is not on home oxygen therapy.  Ventilator:    - No ventilator support    Rehab Therapies: Physical Therapy, Occupational Therapy, and Speech/Language Therapy  Weight Bearing

## 2024-06-24 NOTE — PROGRESS NOTES
[facility] Neurology Specialist  [address]  [place] [address]  PH:  [phone] or [phone]  FAX:  [phone]            Brief history: Sruthi Sandhu is a 78 y.o. old female admitted on 2024 with  general weakness and abnormal HCT.      Subjective: No new neurological events overnight. EEG showed encephalopathy. MRI Brain showed Foci of acute/subacute ischemia involving the globus pallidus bilaterally of uncertain etiology. Discussed with nursing.      Objective: /76   Pulse 72   Temp 98 °F (36.7 °C) (Oral)   Resp 18   Ht 1.524 m (5')   Wt 63 kg (138 lb 14.2 oz)   SpO2 92%   BMI 27.13 kg/m²       Medications:    sertraline  25 mg Oral Daily    valproate (DEPACON) 500 mg in sodium chloride 0.9 % 100 mL IVPB  500 mg IntraVENous Q12H    furosemide  20 mg Oral Daily    metoprolol tartrate  25 mg Oral BID    sodium chloride  80 mL IntraVENous Once    aspirin  81 mg Oral Daily    Vitamin D  5,000 Units Oral Daily    sodium chloride flush  10 mL IntraVENous 2 times per day    enoxaparin  40 mg SubCUTAneous Daily    losartan  25 mg Oral Daily    pantoprazole  40 mg Oral BID AC          Physical Exam:   /76   Pulse 72   Temp 98 °F (36.7 °C) (Oral)   Resp 18   Ht 1.524 m (5')   Wt 63 kg (138 lb 14.2 oz)   SpO2 92%   BMI 27.13 kg/m²   Temp (24hrs), Av °F (36.7 °C), Min:97.5 °F (36.4 °C), Max:98.6 °F (37 °C)        ON EXAMINATION:  GENERAL  Appears comfortable and in no distress   HEENT  NC/ AT   NECK  Supple and no bruits heard   Cardiovascular  S1, S2 heard; radial pulse intact   MENTAL STATUS:  Alert, oriented x self and place but not to the month and year.  With multiple cues; she was able to recall the month.  Good naming and good repetition.  Followed three-step commands well.  No dysarthria noted.  Poor immediate recall.  Intact long-term memory.  No dysarthria noted.  No hallucinations or delusions.   CRANIAL NERVES: II     -       Pupils reactive

## 2024-06-24 NOTE — PROGRESS NOTES
Writer spoke with daughter Meagan on phone. Daughter requesting to speak with neurologist for MRI results.   All questions answered from nursing standpoint, support provided.    @1958 Writer reached out to Neuro provider JADA Hutchison, relayed that daughter would like someone from neuro team to call with MRI results.    @2021 Writer followed up with neuro provider JADA Hutchison regarding calling daughter with MRI results.

## 2024-06-24 NOTE — PROGRESS NOTES
Physical Therapy  Facility/Department: [address]  Daily treatment note    Name: Sruthi Sandhu  : 1945  MRN: [id number]  Date of Service: 2024    Discharge Recommendations:  Patient would benefit from continued therapy after discharge   PT Equipment Recommendations  Equipment Needed: No      Patient Diagnosis(es): The encounter diagnosis was Cerebrovascular accident (CVA), unspecified mechanism (HCC).  Past Medical History:  has a past medical history of Acute dermatitis, Coronary atherosclerosis of native coronary artery, Hyperlipemia, Hypertension, and Metabolic syndrome.  Past Surgical History:  has a past surgical history that includes Coronary angioplasty with stent () and Cataract removal.    Assessment   Body Structures, Functions, Activity Limitations Requiring Skilled Therapeutic Intervention: Decreased functional mobility ;Decreased cognition;Decreased balance;Decreased safe awareness  Assessment: Pt presents from home alone with increasing weakness, Pt possible suffered CVA. At baseline, pt independent gait no device, ind ADL's, drives and does yoga. Pt currently requiring CGA for bed mobility, CGA transfers, pt ambulated 100ft with RW and CGA to min assist x 1. She needed several tactile and verbal cues to redirect during gait to address left side neglect. Pt tolerated LE exercises well and was given rest breaks as needed. Pt does not demonstrate adequate safety for return to prior independent living situation. Pt will benefit from continued skilled PT for safety, balance and functional mobility training while in the hospital and at discharge.  Therapy Prognosis: Good  Decision Making: Medium Complexity  Requires PT Follow-Up: Yes  Activity Tolerance  Activity Tolerance: Treatment limited secondary to decreased cognition  Activity Tolerance Comments: pt tolerated a longer gait distance today but demonstrates L side neglect with Min A to correct with tactile and verbal cues     Plan    Physical Therapy Plan  General Plan:  (5-6x/week)  Current Treatment Recommendations: Balance training, Gait training, Stair training, Functional mobility training, Transfer training, Safety education & training, Cognitive/Perceptual training, Therapeutic activities  Safety Devices  Type of Devices: Nurse notified, Gait belt, Left in chair, Call light within reach, Chair alarm in place  Restraints  Restraints Initially in Place: No     Restrictions  Restrictions/Precautions  Restrictions/Precautions: Fall Risk, Bed Alarm  Required Braces or Orthoses?: No  Position Activity Restriction  Other position/activity restrictions: \"up with assistance\", flat affect, slow to respond     Subjective   General  Patient assessed for rehabilitation services?: Yes  Response To Previous Treatment: Patient with no complaints from previous session.  Family / Caregiver Present: No  Follows Commands: Within Functional Limits  General Comment  Comments: MRI today to assess if CVA occured  Subjective  Subjective: Pt agreeable to therapy. Denies pain. Lying with HOB elevated upon entry and retired to chair at end of session.     Social/Functional History  Social/Functional History  Lives With: Alone  Type of Home: House  Home Layout: One level  Home Access: Stairs to enter with rails  Entrance Stairs - Number of Steps: 1  Entrance Stairs - Rails: Right  Bathroom Shower/Tub: Walk-in shower  Bathroom Toilet: Standard  Bathroom Equipment: Built-in shower seat, Grab bars in shower, Hand-held shower  Bathroom Accessibility: Accessible  Home Equipment: Cane, Walker - Rolling, Walker - 4-Wheeled  Has the patient had two or more falls in the past year or any fall with injury in the past year?: No  Receives Help From: Family, Friend(s)  ADL Assistance: Independent  Homemaking Assistance: Independent  Homemaking Responsibilities: Yes  Meal Prep Responsibility: Primary  Laundry Responsibility: Primary  Cleaning Responsibility: Primary  Bill

## 2024-06-24 NOTE — CARE COORDINATION
Writer spoke with pt's daughter, Meagan, on the phone. ARU vs SNF discussed as possible transition plans, writer sent ARU list printed from Medicare.gov with detailed ratings as well as hospital SNF list to Meagan at [email or web address]    Possible transition plans also discussed with pt, pt provided detailed ARU list and hospital SNF list.                       Post Acute Facility/Agency List     Provided patient and daughter with the following list, the list includes the overall star ratings obtained from CMS per the Medicare Web site (www.Medicare.gov):     [] Long Term Acute Care Facilities  [x] Acute Inpatient Rehabilitation Facilities  [x] Skilled Nursing Facilities  [] Home Care    Provided verbal instructions on how to utilize the QR Code to obtain additional detailed star ratings from www.Medicare.gov

## 2024-06-24 NOTE — PLAN OF CARE
Problem: Discharge Planning  Goal: Discharge to home or other facility with appropriate resources  Outcome: Progressing  Flowsheets (Taken 6/24/2024 0725)  Discharge to home or other facility with appropriate resources: Identify barriers to discharge with patient and caregiver   Pt to discharge to facility once medically cleared.   Problem: Pain  Goal: Verbalizes/displays adequate comfort level or baseline comfort level  Outcome: Progressing  Flowsheets (Taken 6/24/2024 0730)  Verbalizes/displays adequate comfort level or baseline comfort level: Encourage patient to monitor pain and request assistance   Pt has denied pain this shift. Will continue to monitor.   Problem: Safety - Adult  Goal: Free from fall injury  Outcome: Progressing  Flowsheets (Taken 6/24/2024 0741)  Free From Fall Injury: Instruct family/caregiver on patient safety   No injury noted this shift.   Problem: Neurosensory - Adult  Goal: Achieves maximal functionality and self care  Outcome: Progressing   Pt denies any sensation changes this shift.   Problem: Skin/Tissue Integrity  Goal: Absence of new skin breakdown  Description: 1.  Monitor for areas of redness and/or skin breakdown  2.  Assess vascular access sites hourly  3.  Every 4-6 hours minimum:  Change oxygen saturation probe site  4.  Every 4-6 hours:  If on nasal continuous positive airway pressure, respiratory therapy assess nares and determine need for appliance change or resting period.  Outcome: Progressing   No new altered skin noted this shift.   Problem: ABCDS Injury Assessment  Goal: Absence of physical injury  Outcome: Progressing   No injury noted this shift.

## 2024-06-24 NOTE — PLAN OF CARE
Problem: Discharge Planning  Goal: Discharge to home or other facility with appropriate resources  6/24/2024 0302 by Rachel Blount RN  Outcome: Progressing     Problem: Pain  Goal: Verbalizes/displays adequate comfort level or baseline comfort level  6/24/2024 0302 by Rachel Blount RN  Outcome: Progressing     Problem: Skin/Tissue Integrity  Goal: Absence of new skin breakdown  Description: 1.  Monitor for areas of redness and/or skin breakdown  2.  Assess vascular access sites hourly  3.  Every 4-6 hours minimum:  Change oxygen saturation probe site  4.  Every 4-6 hours:  If on nasal continuous positive airway pressure, respiratory therapy assess nares and determine need for appliance change or resting period.  6/24/2024 0302 by Rachel Blount RN  Outcome: Progressing     Problem: Safety - Adult  Goal: Free from fall injury  6/24/2024 0302 by Rachel Blount RN  Outcome: Not Progressing  6/23/2024 1503 by Rosina Garzon RN  Outcome: Progressing  Flowsheets (Taken 6/23/2024 9510)  Free From Fall Injury: Instruct family/caregiver on patient safety     Problem: Neurosensory - Adult  Goal: Achieves maximal functionality and self care  6/24/2024 0302 by Rachel Blount RN  Outcome: Not Progressing  6/23/2024 1503 by Rosina Garzon RN  Outcome: Progressing

## 2024-06-25 ENCOUNTER — APPOINTMENT (OUTPATIENT)
Age: 79
DRG: 066 | End: 2024-06-25
Attending: INTERNAL MEDICINE
Payer: MEDICARE

## 2024-06-25 PROBLEM — R53.81 PHYSICAL DECONDITIONING: Status: ACTIVE | Noted: 2024-06-25

## 2024-06-25 LAB
ALLEN TEST: POSITIVE
ECHO AO ROOT DIAM: 3.3 CM
ECHO AO ROOT INDEX: 2.08 CM/M2
ECHO BSA: 1.63 M2
ECHO EST RA PRESSURE: 5 MMHG
ECHO IVC PROX: 1.3 CM
ECHO LA DIAMETER INDEX: 2.7 CM/M2
ECHO LA DIAMETER: 4.3 CM
ECHO LA TO AORTIC ROOT RATIO: 1.3
ECHO LV EDV A2C: 44 ML
ECHO LV EDV A4C: 51 ML
ECHO LV EDV INDEX A4C: 32 ML/M2
ECHO LV EDV NDEX A2C: 28 ML/M2
ECHO LV EJECTION FRACTION A2C: 74 %
ECHO LV EJECTION FRACTION A4C: 67 %
ECHO LV EJECTION FRACTION BIPLANE: 71 % (ref 55–100)
ECHO LV ESV A2C: 12 ML
ECHO LV ESV A4C: 17 ML
ECHO LV ESV INDEX A2C: 8 ML/M2
ECHO LV ESV INDEX A4C: 11 ML/M2
ECHO LV FRACTIONAL SHORTENING: 43 % (ref 28–44)
ECHO LV INTERNAL DIMENSION DIASTOLE INDEX: 2.52 CM/M2
ECHO LV INTERNAL DIMENSION DIASTOLIC: 4 CM (ref 3.9–5.3)
ECHO LV INTERNAL DIMENSION SYSTOLIC INDEX: 1.45 CM/M2
ECHO LV INTERNAL DIMENSION SYSTOLIC: 2.3 CM
ECHO LV IVSD: 1.2 CM (ref 0.6–0.9)
ECHO LV MASS 2D: 165.5 G (ref 67–162)
ECHO LV MASS INDEX 2D: 104.1 G/M2 (ref 43–95)
ECHO LV POSTERIOR WALL DIASTOLIC: 1.2 CM (ref 0.6–0.9)
ECHO LV RELATIVE WALL THICKNESS RATIO: 0.6
ECHO RV FREE WALL PEAK S': 10 CM/S
ECHO RV TAPSE: 1.5 CM (ref 1.7–?)
FIO2: 21
POC HCO3: 25.6 MMOL/L (ref 21–28)
POC O2 SATURATION: 94.4 % (ref 94–98)
POC PCO2: 37.6 MM HG (ref 35–48)
POC PH: 7.44 (ref 7.35–7.45)
POC PO2: 69.4 MM HG (ref 83–108)
POSITIVE BASE EXCESS, ART: 1.5 MMOL/L (ref 0–3)
SAMPLE SITE: ABNORMAL

## 2024-06-25 PROCEDURE — 92610 EVALUATE SWALLOWING FUNCTION: CPT

## 2024-06-25 PROCEDURE — 2500000003 HC RX 250 WO HCPCS: Performed by: PSYCHIATRY & NEUROLOGY

## 2024-06-25 PROCEDURE — 6370000000 HC RX 637 (ALT 250 FOR IP)

## 2024-06-25 PROCEDURE — 2060000000 HC ICU INTERMEDIATE R&B

## 2024-06-25 PROCEDURE — 99232 SBSQ HOSP IP/OBS MODERATE 35: CPT | Performed by: PSYCHIATRY & NEUROLOGY

## 2024-06-25 PROCEDURE — 96372 THER/PROPH/DIAG INJ SC/IM: CPT

## 2024-06-25 PROCEDURE — 6370000000 HC RX 637 (ALT 250 FOR IP): Performed by: INTERNAL MEDICINE

## 2024-06-25 PROCEDURE — G0378 HOSPITAL OBSERVATION PER HR: HCPCS

## 2024-06-25 PROCEDURE — 99232 SBSQ HOSP IP/OBS MODERATE 35: CPT | Performed by: STUDENT IN AN ORGANIZED HEALTH CARE EDUCATION/TRAINING PROGRAM

## 2024-06-25 PROCEDURE — 2580000003 HC RX 258: Performed by: PSYCHIATRY & NEUROLOGY

## 2024-06-25 PROCEDURE — 93308 TTE F-UP OR LMTD: CPT | Performed by: INTERNAL MEDICINE

## 2024-06-25 PROCEDURE — 6370000000 HC RX 637 (ALT 250 FOR IP): Performed by: STUDENT IN AN ORGANIZED HEALTH CARE EDUCATION/TRAINING PROGRAM

## 2024-06-25 PROCEDURE — 97116 GAIT TRAINING THERAPY: CPT

## 2024-06-25 PROCEDURE — 99222 1ST HOSP IP/OBS MODERATE 55: CPT | Performed by: STUDENT IN AN ORGANIZED HEALTH CARE EDUCATION/TRAINING PROGRAM

## 2024-06-25 PROCEDURE — 93308 TTE F-UP OR LMTD: CPT

## 2024-06-25 PROCEDURE — 6360000002 HC RX W HCPCS: Performed by: INTERNAL MEDICINE

## 2024-06-25 PROCEDURE — 96366 THER/PROPH/DIAG IV INF ADDON: CPT

## 2024-06-25 PROCEDURE — 92523 SPEECH SOUND LANG COMPREHEN: CPT

## 2024-06-25 PROCEDURE — 2580000003 HC RX 258: Performed by: INTERNAL MEDICINE

## 2024-06-25 PROCEDURE — 97110 THERAPEUTIC EXERCISES: CPT

## 2024-06-25 PROCEDURE — 6370000000 HC RX 637 (ALT 250 FOR IP): Performed by: PSYCHIATRY & NEUROLOGY

## 2024-06-25 RX ORDER — DIVALPROEX SODIUM 125 MG/1
250 CAPSULE, COATED PELLETS ORAL 2 TIMES DAILY
Qty: 120 CAPSULE | Refills: 0 | Status: SHIPPED | OUTPATIENT
Start: 2024-06-25 | End: 2024-07-25

## 2024-06-25 RX ORDER — ACETAMINOPHEN 325 MG/1
650 TABLET ORAL ONCE
Status: COMPLETED | OUTPATIENT
Start: 2024-06-25 | End: 2024-06-25

## 2024-06-25 RX ORDER — PANTOPRAZOLE SODIUM 40 MG/1
40 TABLET, DELAYED RELEASE ORAL
Qty: 30 TABLET | Refills: 3 | Status: SHIPPED | OUTPATIENT
Start: 2024-06-26

## 2024-06-25 RX ORDER — SERTRALINE HYDROCHLORIDE 25 MG/1
25 TABLET, FILM COATED ORAL DAILY
Qty: 30 TABLET | Refills: 3 | Status: SHIPPED | OUTPATIENT
Start: 2024-06-26

## 2024-06-25 RX ADMIN — ACETAMINOPHEN 650 MG: 325 TABLET ORAL at 10:33

## 2024-06-25 RX ADMIN — PANTOPRAZOLE SODIUM 40 MG: 40 TABLET, DELAYED RELEASE ORAL at 15:54

## 2024-06-25 RX ADMIN — ENOXAPARIN SODIUM 40 MG: 100 INJECTION SUBCUTANEOUS at 08:54

## 2024-06-25 RX ADMIN — PANTOPRAZOLE SODIUM 40 MG: 40 TABLET, DELAYED RELEASE ORAL at 06:27

## 2024-06-25 RX ADMIN — VALPROATE SODIUM 500 MG: 100 INJECTION, SOLUTION INTRAVENOUS at 12:37

## 2024-06-25 RX ADMIN — FUROSEMIDE 20 MG: 20 TABLET ORAL at 08:52

## 2024-06-25 RX ADMIN — Medication 5000 UNITS: at 08:52

## 2024-06-25 RX ADMIN — SODIUM CHLORIDE, PRESERVATIVE FREE 10 ML: 5 INJECTION INTRAVENOUS at 13:38

## 2024-06-25 RX ADMIN — VALPROATE SODIUM 500 MG: 100 INJECTION, SOLUTION INTRAVENOUS at 00:49

## 2024-06-25 RX ADMIN — METOPROLOL TARTRATE 25 MG: 25 TABLET, FILM COATED ORAL at 20:46

## 2024-06-25 RX ADMIN — SERTRALINE HYDROCHLORIDE 25 MG: 25 TABLET ORAL at 08:52

## 2024-06-25 RX ADMIN — METOPROLOL TARTRATE 25 MG: 25 TABLET, FILM COATED ORAL at 08:52

## 2024-06-25 RX ADMIN — SODIUM CHLORIDE, PRESERVATIVE FREE 10 ML: 5 INJECTION INTRAVENOUS at 08:54

## 2024-06-25 RX ADMIN — LOSARTAN POTASSIUM 25 MG: 25 TABLET, FILM COATED ORAL at 08:52

## 2024-06-25 RX ADMIN — ASPIRIN 81 MG: 81 TABLET, COATED ORAL at 08:52

## 2024-06-25 RX ADMIN — SODIUM CHLORIDE, PRESERVATIVE FREE 10 ML: 5 INJECTION INTRAVENOUS at 21:13

## 2024-06-25 ASSESSMENT — PAIN SCALES - GENERAL
PAINLEVEL_OUTOF10: 0
PAINLEVEL_OUTOF10: 0
PAINLEVEL_OUTOF10: 2

## 2024-06-25 ASSESSMENT — ENCOUNTER SYMPTOMS
FACIAL SWELLING: 0
COLOR CHANGE: 0
DIARRHEA: 0
SHORTNESS OF BREATH: 0
EYE DISCHARGE: 0
ABDOMINAL PAIN: 0
EYE ITCHING: 0
CONSTIPATION: 0
DOUBLE VISION: 0
ABDOMINAL DISTENTION: 0
NAUSEA: 0
CHEST TIGHTNESS: 0
BACK PAIN: 0
BLURRED VISION: 0
APNEA: 0

## 2024-06-25 ASSESSMENT — PAIN DESCRIPTION - DESCRIPTORS: DESCRIPTORS: ACHING

## 2024-06-25 ASSESSMENT — PAIN DESCRIPTION - LOCATION: LOCATION: HEAD

## 2024-06-25 ASSESSMENT — PAIN - FUNCTIONAL ASSESSMENT: PAIN_FUNCTIONAL_ASSESSMENT: ACTIVITIES ARE NOT PREVENTED

## 2024-06-25 NOTE — PROGRESS NOTES
[facility]  Office: [phone]  Michael Bravo DO, Jules Jeffries DO, Yoel Jackson DO, Harvey Garcia DO, Omer Vallejo MD, Randa Mireles MD, Mary Gill MD, Lynette Otto MD,  Erasmo Dowd MD, Fidelina Gonzales MD, Anita Angelo MD,  Karli Shore DO, Andreea Stover MD, Naga Giron MD, Hamilton Bravo DO, Ambreen Reid MD,  Ever Stevens DO, Paulina Martinez MD, Michelle Estrada MD, Magdalene Miner MD, Aldair Appiah MD,  José Antonio Eckert MD, Jo Ann Pyle MD, Trent Cochran MD, Nelson Duque MD, Ap Diehl MD, Lillie Pelletier MD, Florencio Dorman DO, Caleb Prakash DO, Melisa Diaz MD,  Stephan Rodríguez MD, Shirley Waterhouse, CNP,  Kay Ricardo CNP, Anatoly Madrigal, CNP,  Rosina Orellana, DNP, Bhavna Andrade, CNP, Brianna Lopez, CNP, Mercedes Ford CNP, Angy Enrique, CNP, Luzmaria Moreland, PA-C, Mayda Jones PA-C, Tiffany Alcazar, CNP, Cande Sandra, CNP, Demetra Smith, CNP, Chrystal Marie, CNP, Linda Chirinos, CNP, Deepika Bradshaw, CNS, Cielo Licea, CNP, Kelly Espino CNP, Tracy Schwab, CNP         [facility]   IN-PATIENT SERVICE   [facility]    Progress Note    6/25/2024    3:22 PM    Name:   Sruthi Sandhu  MRN:     [id number]     Acct:      [id number]   Room:   [facility] Day:  0  Admit Date:  6/22/2024  1:42 PM    PCP:   Meg Valero MD  Code Status:  Full Code    Subjective:     C/C:   Chief Complaint   Patient presents with    Fatigue     Interval History Status: improved.     Patient seen examined at bedside.  Overall doing much better.  No acute issues overnight.  Patient seems little frustrated and wants to work with therapy.    Brief History:     78-year-old female past medical history of anxiety, hypothyroidism, hyperlipidemia, history of seizures, sleep apnea, history of CVA with recent cardiac catheterization 2/16/2024, CABG x 3 with ligation of left atrial appendage on 3/30/2024, concern for SVT, history of gastric  chronic microvascular disease.     CTA HEAD NECK W CONTRAST    Result Date: 6/22/2024  No acute abnormality or flow-limiting stenosis of the major arteries of the head and neck. Hypoplastic right vertebral artery. Age-indeterminate infarctions in the genu of the bilateral internal capsules.     CT Head W/O Contrast    Result Date: 6/22/2024  Compared with prior CT from 03/30/2024, there is increased hypodensity along the bilateral basal ganglia, right greater than left, which could be due to acute ischemia.  Suggest MRI for further evaluation.     XR CHEST PORTABLE    Result Date: 6/22/2024  Improved left basilar atelectasis.       Physical Examination:     Physical Exam  Constitutional:       Appearance: She is not diaphoretic.      Comments: Chronically ill-appearing   HENT:      Right Ear: External ear normal.      Left Ear: External ear normal.      Nose: Nose normal.      Mouth/Throat:      Mouth: Mucous membranes are moist.   Eyes:      Pupils: Pupils are equal, round, and reactive to light.   Cardiovascular:      Rate and Rhythm: Normal rate and regular rhythm.      Comments: Systolic murmur  Pulmonary:      Breath sounds: No wheezing or rales.   Abdominal:      General: There is no distension.      Palpations: Abdomen is soft.   Musculoskeletal:      Cervical back: Neck supple.      Right lower leg: No edema.      Left lower leg: No edema.   Skin:     General: Skin is dry.      Coloration: Skin is pale.   Neurological:      Mental Status: She is alert and oriented to person, place, and time.      Motor: Weakness present.   Psychiatric:         Mood and Affect: Mood normal.         Behavior: Behavior normal.      Comments: Cooperative, anxious affect         Assessment:     Hospital Problems             Last Modified POA    * (Principal) Generalized weakness 6/22/2024 Yes    Coronary artery disease involving native coronary artery of native heart without angina pectoris 6/22/2024 Yes    Essential hypertension

## 2024-06-25 NOTE — DISCHARGE SUMMARY
[facility]  Office: [phone]  Michael Bravo DO, Jules Jeffries DO, Yoel Jackson DO, Harvey Garcia DO, Omer Vallejo MD, Randa Mireles MD, Mary Gill MD, Lynette Otto MD,  Erasmo Dowd MD, Fidelina Gonzales MD, Anita Angelo MD,  Karli Shore DO, Andreea Stover MD, Naga Giron MD, Hamilton Bravo DO, Ambreen Reid MD,  Ever Stevens DO, Paulina Martinez MD, Michelle Estrada MD, Magdalene Miner MD, Aldair Appiah MD,  José Antonio Eckert MD, Jo Ann Pyle MD, Trent Cochran MD, Nelson Duque MD, Ap Diehl MD, Lillie Pelletier MD, Florencio Dorman DO, Caleb Prakash DO, Melisa Diaz MD,  Stephan Rodríguez MD, Shirley Waterhouse, CNP,  Kay Ricardo CNP, Anatoly Madrigal, CNP,  Rosina Orellana, DNP, Bhavna Andrade, CNP, Brianna Lopez, CNP, Mercedes Ford CNP, Angy Enrique, CNP, Luzmaria Moreland, PA-C, Mayda Jones PA-C, Tiffany Alcazar, CNP, Cande Sandra, CNP, Demetra Smith, CNP, Chrystal Marie, CNP, Linda Chirinos, CNP, Deepika Bradshaw, CNS, Cielo Licea, CNP, Kelly Espino CNP, Tracy Schwab, CNP         [facility]   IN-PATIENT SERVICE   [facility]    Discharge Summary     Patient ID: Sruthi Sandhu  :  1945   MRN: [id number]     ACCOUNT:  [id number]   Patient's PCP: Meg Valero MD  Admit Date: 2024   Discharge Date: 2024     Length of Stay: 1  Code Status:  Full Code  Admitting Physician: Anita Angelo MD  Discharge Physician: Anita Angelo MD     Active Discharge Diagnoses:     Hospital Problem Lists:  Principal Problem:    Generalized weakness  Active Problems:    Coronary artery disease involving native coronary artery of native heart without angina pectoris    Essential hypertension    S/P CABG x 3    Gastroesophageal reflux disease without esophagitis    Moderate episode of recurrent major depressive disorder (HCC)    Stroke-like symptoms    Cerebrovascular accident (CVA) ([facility])    Physical  tablet  Commonly known as: PEPCID     hydroCHLOROthiazide 25 MG tablet  Commonly known as: HYDRODIURIL     nebivolol 5 MG tablet  Commonly known as: BYSTOLIC               Where to Get Your Medications        These medications were sent to [id number] - [place], [place] - [address] - P [phone] - F [phone]  [address] [facility] [address]      Phone: [phone]   divalproex 125 MG DR capsule  metoprolol tartrate 25 MG tablet  pantoprazole 40 MG tablet  sertraline 25 MG tablet         Discharge Procedure Orders   MRI BRAIN W WO CONTRAST   Standing Status: Future Standing Exp. Date: 06/25/25     Order Specific Question Answer Comments   STAT Creatinine as needed: No      Valproic Acid Level, Total and Free   Standing Status: Future Standing Exp. Date: 06/25/25     Almita Zaragoza MD, Neurology, [place]   Referral Priority: Routine Referral Type: Eval and Treat   Referral Reason: Specialty Services Required   Referred to Provider: ALMITA ROCA Requested Specialty: Neurology   Number of Visits Requested: 1       Time Spent on discharge is  33 mins in patient examination, evaluation, counseling as well as medication reconciliation, prescriptions for required medications, discharge plan and follow up.    Electronically signed by   Anita Angelo MD  6/26/2024  7:12 AM      Thank you Meg Guerrero MD for the opportunity to be involved in this patient's care.

## 2024-06-25 NOTE — PROGRESS NOTES
SPEECH LANGUAGE PATHOLOGY  Facility/Department: [address]   CLINICAL BEDSIDE SWALLOW EVALUATION    NAME: Sruthi Sandhu  : 1945  MRN: [id number]    ADMISSION DATE: 2024  ADMITTING DIAGNOSIS: has Coronary artery disease involving native coronary artery of native heart without angina pectoris; Essential hypertension; Hyperlipidemia; Statin intolerance; Syncope; SVT (supraventricular tachycardia) (HCC); Hypokalemia; Unstable angina (HCC); History of PSVT (paroxysmal supraventricular tachycardia); Palpitations; Generalized weakness; S/P CABG x 3; Gastroesophageal reflux disease without esophagitis; Moderate episode of recurrent major depressive disorder (HCC); Stroke-like symptoms; and Cerebrovascular accident (CVA) ([facility]) on their problem list.    Recent Chest Xray: 2024  IMPRESSION:  Improved left basilar atelectasis.    Date of Eval: 2024  Evaluating Therapist: NUHA Lou    Current Diet level:  Current Diet : Regular  Current Liquid Diet : Thin    Primary Complaint  Sruthi Sandhu is a 78 y.o. Non- / non  female who presents with Fatigue   and is admitted to the hospital for the management of Generalized weakness.     Patient is a 78-year-old female who was admitted for management of generalized weakness and fatigue.  Patient is an unclear historian, reporting vague symptoms, but is unclear when they started.  She states she feels weak because \"multiple people are dying.\"  Further inquiry demonstrates patient has not had any recent loss in her life, however just sad about the thought of others dying.  She is alert and oriented x 4.  Per EMR, patient was outside sweeping the walkway today when she was approached by her neighbor who thought that she seemed weak and was not acting herself, and reportedly called EMS to transport her to the emergency department.  Patient reports generalized weakness, although cannot tell me when it started.  She states that she had

## 2024-06-25 NOTE — CARE COORDINATION
Social work; [facility] called and they accepted pt for admission. Will need zoe at discharge. Rosina hanna    Social work: called [facility] and they could not accept another pt today as they had too many admissions.  They can  pt in their van at 3:30 pm pt and family and RN aware of the plan. Will need zoe at discharge. No paperwork needed for transport just copy of zoe to go with pt and demographic sheet. Rosina hanna

## 2024-06-25 NOTE — CONSULTS
Physical Medicine & Rehabilitation  Consult Note      Admitting Physician:  Anita Angelo MD    Primary Care Provider:  Meg Valero MD     Reason for Consult:  Acute Inpatient Rehabilitation    Chief Complaint:  Generalized weakness, fatigue, confusion    History of Present Illness:  Referring Provider is requesting an evaluation for appropriate placement upon discharge from acute care. History from chart review and patient.    Sruthi Sandhu is a 78 y.o. right-handed female with history of CAD s/p CABG (March 2024), HTN, HLD, GI bleed (March 2024) admitted to  [facility]  on 6/22/2024.      She initially presented with generalized weakness, fatigue, and confusion.  CT head showed increased hypodensity along the bilateral basal ganglia, right greater than left.  MRI brain showed foci of acute/subacute ischemia involving the globus pallidus bilaterally with uncertain etiology.  Neurology recommended aspirin.    At the time of evaluation, she reports only chronic numbness/tingling in her feet.  She denies any dizziness, changes in vision, and weakness.    Review of Systems:  Review of Systems   Constitutional:  Negative for fever.   Eyes:  Negative for blurred vision and double vision.   Respiratory:  Negative for shortness of breath.    Cardiovascular:  Negative for chest pain.   Gastrointestinal:  Negative for abdominal pain and nausea.   Neurological:  Positive for sensory change (chronic). Negative for dizziness and weakness.      Premorbid function:  Independent    Current function:    Physical Therapy    Restrictions/Precautions: Fall Risk, Bed Alarm  Other position/activity restrictions: \"up with assistance\", flat affect, slow to respond    Bed mobility  Supine to Sit: Contact guard assistance  Scooting: Stand by assistance  Bed Mobility Comments: HOB near max elevation, CGA for safety, verbal cues for hand placement on rail    Transfers  Sit to Stand: Contact guard assistance  Stand to Sit:  Facility-Administered Medications: sertraline (ZOLOFT) tablet 25 mg, 25 mg, Oral, Daily  valproate (DEPACON) 500 mg in sodium chloride 0.9 % 100 mL IVPB, 500 mg, IntraVENous, Q12H  furosemide (LASIX) tablet 20 mg, 20 mg, Oral, Daily  metoprolol tartrate (LOPRESSOR) tablet 25 mg, 25 mg, Oral, BID  sodium chloride 0.9 % bolus 80 mL, 80 mL, IntraVENous, Once  aspirin EC tablet 81 mg, 81 mg, Oral, Daily  hypromellose (ISOPTO TEARS) 0.5 % ophthalmic solution 1 drop, 1 drop, Both Eyes, PRN  Vitamin D (CHOLECALCIFEROL) tablet 5,000 Units, 5,000 Units, Oral, Daily  sodium chloride flush 0.9 % injection 10 mL, 10 mL, IntraVENous, 2 times per day  sodium chloride flush 0.9 % injection 10 mL, 10 mL, IntraVENous, PRN  0.9 % sodium chloride infusion, , IntraVENous, PRN  ondansetron (ZOFRAN-ODT) disintegrating tablet 4 mg, 4 mg, Oral, Q8H PRN **OR** ondansetron (ZOFRAN) injection 4 mg, 4 mg, IntraVENous, Q6H PRN  magnesium hydroxide (MILK OF MAGNESIA) 400 MG/5ML suspension 30 mL, 30 mL, Oral, Daily PRN  enoxaparin (LOVENOX) injection 40 mg, 40 mg, SubCUTAneous, Daily  acetaminophen (TYLENOL) tablet 650 mg, 650 mg, Oral, Q4H PRN **OR** acetaminophen (TYLENOL) suppository 650 mg, 650 mg, Rectal, Q4H PRN  losartan (COZAAR) tablet 25 mg, 25 mg, Oral, Daily  pantoprazole (PROTONIX) tablet 40 mg, 40 mg, Oral, BID AC  potassium chloride (KLOR-CON M) extended release tablet 20 mEq, 20 mEq, Oral, Daily PRN  diclofenac sodium (VOLTAREN) 1 % gel 2 g, 2 g, Topical, Daily PRN  clobetasol (TEMOVATE) 0.05 % cream, , Topical, Daily PRN    Family History:       Problem Relation Age of Onset    Hypertension Mother     Stroke Mother     Diabetes Sister     Hypertension Sister     Heart Disease Brother     Diabetes Brother     Heart Surgery Brother     Lung Cancer Brother     Hypertension Other     Stroke Other     Heart Surgery Other     Diabetes Other     Coronary Art Dis Other        Social History:  Lives With: Alone  Type of Home: House  Home

## 2024-06-25 NOTE — PROGRESS NOTES
Physical Therapy  Facility/Department: [address]  Physical Therapy Daily Progress Note    Name: Sruthi Sandhu  : 1945  MRN: [id number]  Date of Service: 2024    Discharge Recommendations:  Patient would benefit from continued therapy after discharge   PT Equipment Recommendations  Equipment Needed: No      Patient Diagnosis(es): The encounter diagnosis was Cerebrovascular accident (CVA), unspecified mechanism (HCC).  Past Medical History:  has a past medical history of Acute dermatitis, Coronary atherosclerosis of native coronary artery, Hyperlipemia, Hypertension, and Metabolic syndrome.  Past Surgical History:  has a past surgical history that includes Coronary angioplasty with stent () and Cataract removal.    Assessment   Body Structures, Functions, Activity Limitations Requiring Skilled Therapeutic Intervention: Decreased functional mobility ;Decreased cognition;Decreased balance;Decreased safe awareness  Assessment: Pt presents from home alone with increasing weakness with possible CVA. At baseline, pt independent gait no device, ind ADL's, drives and does yoga. Pt currently requiring SBA for bed mobility, SBA transfers, pt ambulated 150ft with RW and SBA-Min assist due to need for occasional tactile and verbal cues to redirect during gait to address left side neglect. Pt does not demonstrate adequate safety for return to prior independent living situation. Pt will benefit from continued skilled PT for safety, balance and functional mobility training while in the hospital and at discharge.  Therapy Prognosis: Good  Decision Making: Medium Complexity  Requires PT Follow-Up: Yes  Activity Tolerance  Activity Tolerance: Patient tolerated treatment well     Plan   Physical Therapy Plan  General Plan:  (5-6x/week)  Current Treatment Recommendations: Balance training, Gait training, Stair training, Functional mobility training, Transfer training, Safety education & training,  Cognitive/Perceptual training, Therapeutic activities  Safety Devices  Type of Devices: Call light within reach, Chair alarm in place, Gait belt, Left in chair  Restraints  Restraints Initially in Place: No     Restrictions  Restrictions/Precautions  Restrictions/Precautions: Fall Risk, Bed Alarm  Required Braces or Orthoses?: No  Position Activity Restriction  Other position/activity restrictions: \"up with assistance\", flat affect, slow to respond     Subjective   General  Patient assessed for rehabilitation services?: Yes  Response To Previous Treatment: Patient with no complaints from previous session.  Family / Caregiver Present: No  Follows Commands: Within Functional Limits  Other (Comment): Pt followed all commands appropriately, however, slow to initiate response  Subjective  Subjective: Pt supine in bed and agreeable to therapy. Pt denies pain but c/o fatigue.              Cognition   Orientation  Overall Orientation Status: Within Functional Limits  Cognition  Overall Cognitive Status: Exceptions  Arousal/Alertness: Delayed responses to stimuli  Following Commands: Follows multistep commands with increased time;Follows multistep commands with repitition;Follows one step commands consistently  Attention Span: Attends with cues to redirect  Safety Judgement: Decreased awareness of need for assistance;Decreased awareness of need for safety  Insights: Decreased awareness of deficits  Initiation: Requires cues for some  Sequencing: Requires cues for some  Cognition Comment: flat affect, left side neglect, slow to respond to most questions     Objective                                Bed mobility  Supine to Sit: Stand by assistance  Scooting: Stand by assistance  Bed Mobility Comments: Pt retired to chair at end of session.  Transfers  Sit to Stand: Stand by assistance  Stand to Sit: Stand by assistance  Stand Pivot Transfers: Stand by assistance  Comment: Transfers with RW and without device. Pt toileted with  CMS G-Code Modifier : CK         Tinneti Score       Goals  Short Term Goals  Time Frame for Short Term Goals: 14 visits  Short Term Goal 1: supine to and from sit independent  Short Term Goal 2: sit to and from stand independent  Short Term Goal 3: ambulate 200ft no device independent  Short Term Goal 4: one step with R grab bar modifed independent  Patient Goals   Patient Goals : to feel like myself       Education  Patient Education  Education Given To: Patient  Education Provided: Plan of Care;Home Exercise Program;Transfer Training  Education Provided Comments: LE exercise, walker management  Education Method: Verbal;Demonstration  Barriers to Learning: Cognition  Education Outcome: Verbalized understanding;Demonstrated understanding;Continued education needed      Therapy Time   Individual Concurrent Group Co-treatment   Time In 1155         Time Out 1221         Minutes 26         Timed Code Treatment Minutes: 26 Minutes       Татьяна Reyes, PT

## 2024-06-25 NOTE — PLAN OF CARE
Problem: Discharge Planning  Goal: Discharge to home or other facility with appropriate resources  6/25/2024 0312 by Niles Durant RN  Outcome: Progressing     Problem: Pain  Goal: Verbalizes/displays adequate comfort level or baseline comfort level  6/25/2024 0312 by Niles Durant RN  Outcome: Progressing     Problem: Safety - Adult  Goal: Free from fall injury  6/25/2024 0312 by Niles Durant RN  Outcome: Progressing     Problem: Neurosensory - Adult  Goal: Achieves maximal functionality and self care  6/25/2024 0312 by Niles Durant RN  Outcome: Progressing     Problem: Skin/Tissue Integrity  Goal: Absence of new skin breakdown  Description: 1.  Monitor for areas of redness and/or skin breakdown  2.  Assess vascular access sites hourly  3.  Every 4-6 hours minimum:  Change oxygen saturation probe site  4.  Every 4-6 hours:  If on nasal continuous positive airway pressure, respiratory therapy assess nares and determine need for appliance change or resting period.  6/25/2024 0312 by Niles Durant RN  Outcome: Progressing

## 2024-06-25 NOTE — CARE COORDINATION
CM called and spoke with patient's daughter, Meagan [phone], to discuss transitional planning. Meagan states that she never received IPR list from CM yesterday. CM [email or web address]. BARBARA spoke with patient and she requests referral be sent to [facility]. Referral sent.

## 2024-06-25 NOTE — PROGRESS NOTES
[facility]  [address]  [place] [address]  PH:  [phone] or [phone]  FAX:  [phone]            Brief history: Sruthi Sandhu is a 78 y.o. old female admitted on 2024 with  general weakness and abnormal HCT.      Subjective: No new neurological events overnight. EEG showed encephalopathy. MRI Brain showed Foci of acute/subacute ischemia involving the globus pallidus bilaterally of uncertain etiology. MRI contrast did not reveal any enhancement. Discussed with nursing and daughter Meagan via telephone.      Objective: BP (!) 149/70   Pulse 66   Temp 97.7 °F (36.5 °C) (Oral)   Resp 20   Ht 1.524 m (5')   Wt 63 kg (138 lb 14.2 oz)   SpO2 92%   BMI 27.13 kg/m²       Medications:    acetaminophen  650 mg Oral Once    sertraline  25 mg Oral Daily    valproate (DEPACON) 500 mg in sodium chloride 0.9 % 100 mL IVPB  500 mg IntraVENous Q12H    furosemide  20 mg Oral Daily    metoprolol tartrate  25 mg Oral BID    sodium chloride  80 mL IntraVENous Once    aspirin  81 mg Oral Daily    Vitamin D  5,000 Units Oral Daily    sodium chloride flush  10 mL IntraVENous 2 times per day    enoxaparin  40 mg SubCUTAneous Daily    losartan  25 mg Oral Daily    pantoprazole  40 mg Oral BID AC          Physical Exam:   BP (!) 149/70   Pulse 66   Temp 97.7 °F (36.5 °C) (Oral)   Resp 20   Ht 1.524 m (5')   Wt 63 kg (138 lb 14.2 oz)   SpO2 92%   BMI 27.13 kg/m²   Temp (24hrs), Av.8 °F (36.6 °C), Min:97.7 °F (36.5 °C), Max:98.1 °F (36.7 °C)        ON EXAMINATION:  GENERAL  Appears comfortable and in no distress   HEENT  NC/ AT   NECK  Supple and no bruits heard   Cardiovascular  S1, S2 heard; radial pulse intact   MENTAL STATUS:  Alert, oriented x self and place but not to the month and year.  With multiple cues; she was able to recall the month.  Good naming and good repetition.  Followed three-step commands well.  No dysarthria noted.  Poor immediate recall.

## 2024-06-25 NOTE — PLAN OF CARE
Problem: Discharge Planning  Goal: Discharge to home or other facility with appropriate resources  6/25/2024 1225 by Elizabeth aPrson RN  Outcome: Progressing  6/25/2024 0312 by Niles Durant RN  Outcome: Progressing  Patient actively participates in ADL's and decision making regarding plan of care.     Problem: Pain  Goal: Verbalizes/displays adequate comfort level or baseline comfort level  6/25/2024 1225 by Elizabeth Parson RN  Outcome: Progressing  6/25/2024 0312 by Niles Durant RN  Outcome: Progressing  No new signs/symptoms of pain noted, pain rating < 3 on scale 0-10, pain controlled with medication/repositioning.     Problem: Safety - Adult  Goal: Free from fall injury  6/25/2024 1225 by Elizabeth Parson RN  Outcome: Progressing  6/25/2024 0312 by Niles Durant RN  Outcome: Progressing  No falls/injuries this shift, bed in lowest position, brakes on, bed alarm on, call light in reach, side rails up x2.     Problem: Neurosensory - Adult  Goal: Achieves maximal functionality and self care  6/25/2024 1225 by Elizabeth Parson RN  Outcome: Progressing  6/25/2024 0312 by Niles Durant RN  Outcome: Progressing     Problem: Skin/Tissue Integrity  Goal: Absence of new skin breakdown  Description: 1.  Monitor for areas of redness and/or skin breakdown  2.  Assess vascular access sites hourly  3.  Every 4-6 hours minimum:  Change oxygen saturation probe site  4.  Every 4-6 hours:  If on nasal continuous positive airway pressure, respiratory therapy assess nares and determine need for appliance change or resting period.  6/25/2024 1225 by Elizabeth Parson RN  Outcome: Progressing  6/25/2024 0312 by Niles Durant RN  Outcome: Progressing  No new skin breakdown noted, no new signs/symptoms of infection, continue to monitor labwork including WBC, medications administered per physician orders.     Problem: ABCDS Injury Assessment  Goal: Absence of physical injury  6/25/2024 1225 by Elizabeth Parson RN  Outcome: Progressing  6/25/2024 0312 by Carleen

## 2024-06-25 NOTE — PROGRESS NOTES
SPEECH LANGUAGE PATHOLOGY  Facility/Department: [address]  Initial Speech/Language/Cognitive Assessment    NAME: Sruthi Sandhu  : 1945   MRN: [id number]  ADMISSION DATE: 2024  ADMITTING DIAGNOSIS: has Coronary artery disease involving native coronary artery of native heart without angina pectoris; Essential hypertension; Hyperlipidemia; Statin intolerance; Syncope; SVT (supraventricular tachycardia) (HCC); Hypokalemia; Unstable angina (HCC); History of PSVT (paroxysmal supraventricular tachycardia); Palpitations; Generalized weakness; S/P CABG x 3; Gastroesophageal reflux disease without esophagitis; Moderate episode of recurrent major depressive disorder (HCC); Stroke-like symptoms; and Cerebrovascular accident (CVA) (HCC) on their problem list.    Date of Eval: 2024   Evaluating Therapist: NUHA Lou    RECENT RESULTS MRI: 2024  IMPRESSION:  Foci of acute/subacute ischemia involving the globus pallidus bilaterally of  uncertain etiology.  Underlying toxic or metabolic processes are  consideration and correlation is needed.     Underlying chronic microvascular disease.    Primary Complaint:   Sruthi Sandhu is a 78 y.o. Non- / non  female who presents with Fatigue   and is admitted to the hospital for the management of Generalized weakness.     Patient is a 78-year-old female who was admitted for management of generalized weakness and fatigue.  Patient is an unclear historian, reporting vague symptoms, but is unclear when they started.  She states she feels weak because \"multiple people are dying.\"  Further inquiry demonstrates patient has not had any recent loss in her life, however just sad about the thought of others dying.  She is alert and oriented x 4.  Per EMR, patient was outside sweeping the walkway today when she was approached by her neighbor who thought that she seemed weak and was not acting herself, and reportedly called EMS to transport her to the  Education: results and recommendations  Patient Education Response: Verbalizes understanding;Needs reinforcement          Therapy Time:   Individual Concurrent Group Co-treatment   Time In 1411         Time Out 1426         Minutes 15                 Electronically signed by Rupesh Lundy M.S., CCC-SLP on 6/25/2024 at 2:48 PM

## 2024-06-26 VITALS
HEIGHT: 60 IN | SYSTOLIC BLOOD PRESSURE: 136 MMHG | RESPIRATION RATE: 16 BRPM | TEMPERATURE: 98.2 F | DIASTOLIC BLOOD PRESSURE: 72 MMHG | OXYGEN SATURATION: 98 % | WEIGHT: 148.59 LBS | HEART RATE: 72 BPM | BODY MASS INDEX: 29.17 KG/M2

## 2024-06-26 PROCEDURE — 99239 HOSP IP/OBS DSCHRG MGMT >30: CPT | Performed by: STUDENT IN AN ORGANIZED HEALTH CARE EDUCATION/TRAINING PROGRAM

## 2024-06-26 PROCEDURE — 97535 SELF CARE MNGMENT TRAINING: CPT

## 2024-06-26 PROCEDURE — 6360000002 HC RX W HCPCS: Performed by: INTERNAL MEDICINE

## 2024-06-26 PROCEDURE — 99232 SBSQ HOSP IP/OBS MODERATE 35: CPT | Performed by: PSYCHIATRY & NEUROLOGY

## 2024-06-26 PROCEDURE — 97530 THERAPEUTIC ACTIVITIES: CPT

## 2024-06-26 PROCEDURE — 6370000000 HC RX 637 (ALT 250 FOR IP)

## 2024-06-26 PROCEDURE — 97116 GAIT TRAINING THERAPY: CPT

## 2024-06-26 PROCEDURE — 6370000000 HC RX 637 (ALT 250 FOR IP): Performed by: INTERNAL MEDICINE

## 2024-06-26 PROCEDURE — 97110 THERAPEUTIC EXERCISES: CPT

## 2024-06-26 PROCEDURE — 6370000000 HC RX 637 (ALT 250 FOR IP): Performed by: PSYCHIATRY & NEUROLOGY

## 2024-06-26 PROCEDURE — 2580000003 HC RX 258: Performed by: PSYCHIATRY & NEUROLOGY

## 2024-06-26 PROCEDURE — 2580000003 HC RX 258: Performed by: INTERNAL MEDICINE

## 2024-06-26 PROCEDURE — 2500000003 HC RX 250 WO HCPCS: Performed by: PSYCHIATRY & NEUROLOGY

## 2024-06-26 RX ADMIN — VALPROATE SODIUM 500 MG: 100 INJECTION, SOLUTION INTRAVENOUS at 01:01

## 2024-06-26 RX ADMIN — VALPROATE SODIUM 500 MG: 100 INJECTION, SOLUTION INTRAVENOUS at 12:32

## 2024-06-26 RX ADMIN — ASPIRIN 81 MG: 81 TABLET, COATED ORAL at 08:45

## 2024-06-26 RX ADMIN — SODIUM CHLORIDE, PRESERVATIVE FREE 10 ML: 5 INJECTION INTRAVENOUS at 08:48

## 2024-06-26 RX ADMIN — METOPROLOL TARTRATE 25 MG: 25 TABLET, FILM COATED ORAL at 08:45

## 2024-06-26 RX ADMIN — ENOXAPARIN SODIUM 40 MG: 100 INJECTION SUBCUTANEOUS at 08:48

## 2024-06-26 RX ADMIN — LOSARTAN POTASSIUM 25 MG: 25 TABLET, FILM COATED ORAL at 08:45

## 2024-06-26 RX ADMIN — PANTOPRAZOLE SODIUM 40 MG: 40 TABLET, DELAYED RELEASE ORAL at 14:09

## 2024-06-26 RX ADMIN — PANTOPRAZOLE SODIUM 40 MG: 40 TABLET, DELAYED RELEASE ORAL at 05:50

## 2024-06-26 RX ADMIN — Medication 5000 UNITS: at 08:45

## 2024-06-26 RX ADMIN — FUROSEMIDE 20 MG: 20 TABLET ORAL at 08:45

## 2024-06-26 RX ADMIN — SERTRALINE HYDROCHLORIDE 25 MG: 25 TABLET ORAL at 08:45

## 2024-06-26 ASSESSMENT — ENCOUNTER SYMPTOMS
EYE DISCHARGE: 0
ABDOMINAL PAIN: 0
BACK PAIN: 0
CHEST TIGHTNESS: 0
APNEA: 0
COLOR CHANGE: 0
EYE ITCHING: 0
DIARRHEA: 0
FACIAL SWELLING: 0
CONSTIPATION: 0
ABDOMINAL DISTENTION: 0

## 2024-06-26 NOTE — PROGRESS NOTES
Occupational Therapy  Facility/Department: [address]  Occupational Therapy Daily Treatment Note    Name: Sruthi Sandhu  : 1945  MRN: [id number]  Date of Service: 2024    Discharge Recommendations:  Patient would benefit from continued therapy after discharge  OT Equipment Recommendations  Other: TBD       Patient Diagnosis(es): The primary encounter diagnosis was Cerebrovascular accident (CVA), unspecified mechanism (HCC). A diagnosis of Stroke-like symptoms was also pertinent to this visit.  Past Medical History:  has a past medical history of Acute dermatitis, Coronary atherosclerosis of native coronary artery, Hyperlipemia, Hypertension, and Metabolic syndrome.  Past Surgical History:  has a past surgical history that includes Coronary angioplasty with stent () and Cataract removal.           Assessment   Performance deficits / Impairments: Decreased functional mobility ;Decreased ADL status;Decreased balance  Assessment: Patient progressing towards STGs. Patient continues to demnstrate above deficits. Patient would benefit from skilled OT services addressing above deficits while here at hospital and following discharge to maximize independence in prep for eventual return home. Patient currently unsafe to return to prior living arrangements.  Prognosis: Good  REQUIRES OT FOLLOW-UP: Yes  Activity Tolerance  Activity Tolerance: Patient Tolerated treatment well  Activity Tolerance Comments: completed B UE/LE coordination activities        Plan   Occupational Therapy Plan  Times Per Week: 5-6x/wk  Current Treatment Recommendations: Balance training, Functional mobility training, Safety education & training, Patient/Caregiver education & training, Equipment evaluation, education, & procurement, Self-Care / ADL, Neuromuscular re-education     Restrictions  Restrictions/Precautions  Restrictions/Precautions: Fall Risk, Bed Alarm  Required Braces or Orthoses?: No  Position Activity  Impaired  Insights: Decreased awareness of deficits  Initiation: Requires cues for some  Sequencing: Requires cues for some  Cognition Comment: flat affect, left side neglect, slow to respond to most questions  Orientation  Overall Orientation Status: Within Functional Limits  Orientation Level: Oriented to place;Oriented to time;Oriented to person                  Education Given To: Patient;Family  Education Provided: Role of Therapy;ADL Adaptive Strategies;Transfer Training;Mobility Training;Equipment;Family Education;Home Exercise Program  Education Provided Comments: educated patient and daughter on FMC/dexterity tasks to work on FMC R hand  Education Method: Verbal;Demonstration  Barriers to Learning: Cognition  Education Outcome: Verbalized understanding;Continued education needed                  AM-PAC - ADL  AM-PAC Daily Activity - Inpatient   How much help is needed for putting on and taking off regular lower body clothing?: A Little  How much help is needed for bathing (which includes washing, rinsing, drying)?: A Little  How much help is needed for toileting (which includes using toilet, bedpan, or urinal)?: A Little  How much help is needed for putting on and taking off regular upper body clothing?: None  How much help is needed for taking care of personal grooming?: A Little  How much help for eating meals?: None  AM-[facility] Inpatient Daily Activity Raw Score: 20  AM-PAC Inpatient ADL T-Scale Score : 42.03  ADL Inpatient CMS 0-100% Score: 38.32  ADL Inpatient CMS G-Code Modifier : CJ        Goals  Short Term Goals  Time Frame for Short Term Goals: 14 visits  Short Term Goal 1: Pt will complete UB ADLs/grooming with MOD IND  Short Term Goal 2: Pt will complete LB ADLs/toileting with MOD IND  Short Term Goal 3: Pt will complete functional mobility/transfers with MOD IND in prep for ADL completion  Short Term Goal 4: Pt will improve dynamic standing balance to good for increased safety during standing

## 2024-06-26 NOTE — CARE COORDINATION
Discharge Report    [facility]  Clinical Case Management Department  Written by: Sarah Centeno RN    Patient Name: Sruthi Sandhu  Attending Provider: Anita Angelo MD  Admit Date: 2024  1:42 PM  MRN: [id number]  Account: [id number]                     : 1945  Discharge Date: 24      Disposition: IPR- RHNWO    RN to call report to [phone]

## 2024-06-26 NOTE — PLAN OF CARE
Problem: Discharge Planning  Goal: Discharge to home or other facility with appropriate resources  Outcome: Progressing     Problem: Pain  Goal: Verbalizes/displays adequate comfort level or baseline comfort level  Outcome: Progressing     Problem: Safety - Adult  Goal: Free from fall injury  Outcome: Progressing     Problem: Neurosensory - Adult  Goal: Achieves maximal functionality and self care  Outcome: Progressing     Problem: Skin/Tissue Integrity  Goal: Absence of new skin breakdown  Description: 1.  Monitor for areas of redness and/or skin breakdown  2.  Assess vascular access sites hourly  3.  Every 4-6 hours minimum:  Change oxygen saturation probe site  4.  Every 4-6 hours:  If on nasal continuous positive airway pressure, respiratory therapy assess nares and determine need for appliance change or resting period.  Outcome: Progressing

## 2024-06-26 NOTE — PROGRESS NOTES
[facility]  Office: [phone]  Michael Bravo DO, Jules Jeffries DO, Yoel Jackson DO, Harvey Garcia DO, Omer Vallejo MD, Randa Mireles MD, Mary Gill MD, Lynette Otto MD,  Erasmo Dowd MD, Fidelina Gonzales MD, Anita Angelo MD,  Karli Shore DO, Andreea Stover MD, Naga Giron MD, Hamilton Bravo DO, Ambreen Reid MD,  Ever Stevens DO, Paulina Martinez MD, Michelle Estrada MD, Magdalene Miner MD, Aldair Appiah MD,  José Antonio Eckert MD, Jo Ann Pyle MD, Trent Cochran MD, Nelson Duque MD, Ap Diehl MD, Lillie Pelletier MD, Florencio Dorman DO, Caleb Prakash DO, Melisa Diaz MD,  Stephan Rodríguez MD, Shirley Waterhouse, CNP,  Kay Ricardo CNP, Anatoly Madrigal, CNP,  Rosina Orellana, DNP, Bhavna Andrade, CNP, Brianna Lopez, CNP, Mercedes Ford CNP, Angy Enrique, CNP, Luzmaria Moreland, PA-C, Mayda Jones PA-C, Tiffany Alcazar, CNP, Cande Sandra, CNP, Demetra Smith, CNP, Chrystal Marie, CNP, Linda Chirinos, CNP, Deepika Bradshaw, CNS, Cielo Licea, CNP, Kelly Espino CNP, Tracy Schwab, CNP         [facility]   IN-PATIENT SERVICE   [facility]    Progress Note    6/26/2024    10:45 AM    Name:   Sruthi Sandhu  MRN:     [id number]     Acct:      [id number]   Room:   333/333-01   Day:  1  Admit Date:  6/22/2024  1:42 PM    PCP:   Meg Valero MD  Code Status:  Full Code    Subjective:     C/C:   Chief Complaint   Patient presents with    Fatigue     Interval History Status: improved.     Patient seen examined at bedside.  Feeling well eating breakfast.  No acute complaints.  Patient states that she has intermittent weakness but is improving.  Looks forward to working with therapy later today.  Plan to be discharged to rehab facility.  Brief History:     78-year-old female past medical history of anxiety, hypothyroidism, hyperlipidemia, history of seizures, sleep apnea, history of CVA with recent cardiac catheterization 2/16/2024, CABG x 3

## 2024-06-26 NOTE — PLAN OF CARE
Problem: Discharge Planning  Goal: Discharge to home or other facility with appropriate resources  6/26/2024 1216 by Elizabeth Parson RN  Outcome: Adequate for Discharge  6/26/2024 0229 by Niles Durant RN  Outcome: Progressing     Problem: Pain  Goal: Verbalizes/displays adequate comfort level or baseline comfort level  6/26/2024 1216 by Elizabeth Parson RN  Outcome: Adequate for Discharge  6/26/2024 0229 by Niles Durant RN  Outcome: Progressing     Problem: Safety - Adult  Goal: Free from fall injury  6/26/2024 1216 by Elizabeth Parson RN  Outcome: Adequate for Discharge  6/26/2024 0229 by Niles Durant RN  Outcome: Progressing     Problem: Neurosensory - Adult  Goal: Achieves maximal functionality and self care  6/26/2024 1216 by Elizabeth Parson RN  Outcome: Adequate for Discharge  6/26/2024 0229 by Niles Durant RN  Outcome: Progressing     Problem: Skin/Tissue Integrity  Goal: Absence of new skin breakdown  Description: 1.  Monitor for areas of redness and/or skin breakdown  2.  Assess vascular access sites hourly  3.  Every 4-6 hours minimum:  Change oxygen saturation probe site  4.  Every 4-6 hours:  If on nasal continuous positive airway pressure, respiratory therapy assess nares and determine need for appliance change or resting period.  6/26/2024 1216 by Elizabeth Parson RN  Outcome: Adequate for Discharge  6/26/2024 0229 by Niles Durant RN  Outcome: Progressing     Problem: ABCDS Injury Assessment  Goal: Absence of physical injury  6/26/2024 1216 by Elizabeth Parson RN  Outcome: Adequate for Discharge  6/26/2024 0229 by Niles Durant RN  Outcome: Progressing     Problem: Chronic Conditions and Co-morbidities  Goal: Patient's chronic conditions and co-morbidity symptoms are monitored and maintained or improved  Outcome: Adequate for Discharge

## 2024-06-26 NOTE — PROGRESS NOTES
[facility] Neurology Specialist  [address]  [place] [address]  PH:  [phone] or [phone]  FAX:  [phone]            Brief history: Sruthi Sandhu is a 78 y.o. old female admitted on 2024 with  general weakness and abnormal HCT.      Subjective: Discussed with patients daughter at bedside at length and answered all questions to the best of my ability. Plan for rehab today. Daughter states she is unsure if she was taking ASA after her cardiac procedure as the patient was hesitant to take medications.      Objective: BP (!) 146/70   Pulse 68   Temp 97.5 °F (36.4 °C) (Oral)   Resp 16   Ht 1.524 m (5')   Wt 67.4 kg (148 lb 9.4 oz)   SpO2 96%   BMI 29.02 kg/m²       Medications:    sertraline  25 mg Oral Daily    valproate (DEPACON) 500 mg in sodium chloride 0.9 % 100 mL IVPB  500 mg IntraVENous Q12H    furosemide  20 mg Oral Daily    metoprolol tartrate  25 mg Oral BID    sodium chloride  80 mL IntraVENous Once    aspirin  81 mg Oral Daily    Vitamin D  5,000 Units Oral Daily    sodium chloride flush  10 mL IntraVENous 2 times per day    enoxaparin  40 mg SubCUTAneous Daily    losartan  25 mg Oral Daily    pantoprazole  40 mg Oral BID AC          Physical Exam:   BP (!) 146/70   Pulse 68   Temp 97.5 °F (36.4 °C) (Oral)   Resp 16   Ht 1.524 m (5')   Wt 67.4 kg (148 lb 9.4 oz)   SpO2 96%   BMI 29.02 kg/m²   Temp (24hrs), Av.9 °F (36.6 °C), Min:97.5 °F (36.4 °C), Max:98.3 °F (36.8 °C)        ON EXAMINATION:  GENERAL  Appears comfortable and in no distress   HEENT  NC/ AT   NECK  Supple and no bruits heard   Cardiovascular  S1, S2 heard; radial pulse intact   MENTAL STATUS:  Alert, oriented x self and place but not to the month and year.  With multiple cues; she was able to recall the month.  Good naming and good repetition.  Followed three-step commands well.  No dysarthria noted.  Poor immediate recall.  Intact long-term memory.  No dysarthria noted.  No

## 2024-06-26 NOTE — PROGRESS NOTES
Physical Therapy  Facility/Department: [address]  Physical Therapy Daily Progress Note    Name: Sruthi Sandhu  : 1945  MRN: [id number]  Date of Service: 2024    Discharge Recommendations:  Patient would benefit from continued therapy after discharge   PT Equipment Recommendations  Equipment Needed: No      Patient Diagnosis(es): The primary encounter diagnosis was Cerebrovascular accident (CVA), unspecified mechanism (HCC). A diagnosis of Stroke-like symptoms was also pertinent to this visit.  Past Medical History:  has a past medical history of Acute dermatitis, Coronary atherosclerosis of native coronary artery, Hyperlipemia, Hypertension, and Metabolic syndrome.  Past Surgical History:  has a past surgical history that includes Coronary angioplasty with stent () and Cataract removal.    Assessment   Body Structures, Functions, Activity Limitations Requiring Skilled Therapeutic Intervention: Decreased functional mobility ;Decreased cognition;Decreased balance;Decreased safe awareness  Assessment: Pt presents from home alone with increasing weakness with possible CVA. At baseline, pt independent gait no device, ind ADL's, drives and does yoga. Pt currently requiring SBA for bed mobility, SBA transfers, pt ambulated 150ft with RW and 60' with straight cane with CGA. Pt does not demonstrate adequate safety for return to prior independent living situation. Pt will benefit from continued skilled PT for safety, balance and functional mobility training while in the hospital and at discharge.  Therapy Prognosis: Good  Requires PT Follow-Up: Yes  Activity Tolerance  Activity Tolerance: Patient tolerated treatment well     Plan   Physical Therapy Plan  General Plan:  (5-6x/week)  Current Treatment Recommendations: Balance training, Gait training, Stair training, Functional mobility training, Transfer training, Safety education & training, Cognitive/Perceptual training, Therapeutic activities  Safety  Devices  Type of Devices: Call light within reach, Chair alarm in place, Gait belt, Left in chair  Restraints  Restraints Initially in Place: No     Restrictions  Restrictions/Precautions  Restrictions/Precautions: Fall Risk, Bed Alarm  Required Braces or Orthoses?: No  Position Activity Restriction  Other position/activity restrictions: \"up with assistance\", flat affect, slow to respond     Subjective   General  Patient assessed for rehabilitation services?: Yes  Response To Previous Treatment: Patient with no complaints from previous session.  Family / Caregiver Present: Yes  Follows Commands: Within Functional Limits  Other (Comment): Pt followed all commands appropriately, however, slow to initiate response  Subjective  Subjective: Pt supine in bed and refused on first attempt; and agreeable to therapy in PM. Pt denies pain but c/o fatigue.              Cognition   Orientation  Overall Orientation Status: Within Functional Limits  Orientation Level: Oriented to place;Oriented to time;Oriented to person  Cognition  Overall Cognitive Status: Exceptions  Arousal/Alertness: Delayed responses to stimuli  Following Commands: Follows multistep commands with increased time;Follows multistep commands with repitition;Follows one step commands consistently  Attention Span: Attends with cues to redirect  Safety Judgement: Decreased awareness of need for assistance;Decreased awareness of need for safety  Problem Solving: Impaired  Insights: Decreased awareness of deficits  Initiation: Requires cues for some  Sequencing: Requires cues for some  Cognition Comment: flat affect, left side neglect, slow to respond to most questions     Objective                                Bed mobility  Supine to Sit: Stand by assistance  Scooting: Stand by assistance  Bed Mobility Comments: Pt retired to chair at end of session.  Transfers  Sit to Stand: Stand by assistance  Stand to Sit: Stand by assistance  Stand Pivot Transfers: Stand by

## 2024-06-26 NOTE — PROGRESS NOTES
Patient discharged to  [facility] via wheelchair with Javi from  [facility] transport. Patient left with all belongings and daughter Meagan will follow to facility.

## 2024-06-26 NOTE — DISCHARGE INSTR - DIET

## 2024-06-26 NOTE — PLAN OF CARE
Problem: Discharge Planning  Goal: Discharge to home or other facility with appropriate resources  6/26/2024 1217 by Elizabeth Parson RN  Outcome: Completed  6/26/2024 1216 by Elizabeth Parson RN  Outcome: Adequate for Discharge  6/26/2024 0229 by Niles Durant RN  Outcome: Progressing     Problem: Pain  Goal: Verbalizes/displays adequate comfort level or baseline comfort level  6/26/2024 1217 by Elizabeth Parson RN  Outcome: Completed  6/26/2024 1216 by Elizabeth Parson RN  Outcome: Adequate for Discharge  6/26/2024 0229 by Niles Durant RN  Outcome: Progressing     Problem: Safety - Adult  Goal: Free from fall injury  6/26/2024 1217 by Elizabeth Parson RN  Outcome: Completed  6/26/2024 1216 by Elizabeth Parson RN  Outcome: Adequate for Discharge  6/26/2024 0229 by Niles Durant RN  Outcome: Progressing     Problem: Neurosensory - Adult  Goal: Achieves maximal functionality and self care  6/26/2024 1217 by Elizabeth Parson RN  Outcome: Completed  6/26/2024 1216 by Elizabeth Parson RN  Outcome: Adequate for Discharge  6/26/2024 0229 by Niles Durant RN  Outcome: Progressing     Problem: Skin/Tissue Integrity  Goal: Absence of new skin breakdown  Description: 1.  Monitor for areas of redness and/or skin breakdown  2.  Assess vascular access sites hourly  3.  Every 4-6 hours minimum:  Change oxygen saturation probe site  4.  Every 4-6 hours:  If on nasal continuous positive airway pressure, respiratory therapy assess nares and determine need for appliance change or resting period.  6/26/2024 1217 by Elizabeth Parson RN  Outcome: Completed  6/26/2024 1216 by Elizabeth Parson RN  Outcome: Adequate for Discharge  6/26/2024 0229 by Niles Durant RN  Outcome: Progressing     Problem: ABCDS Injury Assessment  Goal: Absence of physical injury  6/26/2024 1217 by Elizabeth Parson, RN  Outcome: Completed  6/26/2024 1216 by Elizabeth Parson RN  Outcome: Adequate for Discharge  6/26/2024 0229 by Niles Durant RN  Outcome: Progressing     Problem: Chronic Conditions and

## 2024-06-27 LAB
VALPROIC ACID % FREE: 20 % (ref 5–18)
VALPROIC ACID TOTAL: 82 UG/ML (ref 50–125)
VALPROIC ACID, FREE: 16 UG/ML (ref 7–23)

## 2024-07-20 RX ORDER — DILTIAZEM HYDROCHLORIDE 120 MG/1
120 CAPSULE, EXTENDED RELEASE ORAL 2 TIMES DAILY
COMMUNITY

## 2024-07-20 RX ORDER — HYDROCHLOROTHIAZIDE 12.5 MG/1
12.5 CAPSULE, GELATIN COATED ORAL DAILY
COMMUNITY

## 2024-10-14 ENCOUNTER — HOSPITAL ENCOUNTER (OUTPATIENT)
Age: 79
Setting detail: SPECIMEN
Discharge: HOME | End: 2024-10-14

## 2024-10-14 LAB
ALBUMIN SERPL-MCNC: 3.9 G/DL (ref 3.5–5.2)
ALP SERPL-CCNC: 77 U/L (ref 35–104)
ALT SERPL-CCNC: 7 U/L (ref 5–33)
ANION GAP SERPL CALCULATED.3IONS-SCNC: 11 MMOL/L (ref 9–17)
AST SERPL-CCNC: 16 U/L
BASOPHILS # BLD: 0.07 K/UL (ref 0–0.2)
BASOPHILS NFR BLD: 2 % (ref 0–2)
BILIRUB SERPL-MCNC: 0.9 MG/DL (ref 0.3–1.2)
BNP SERPL-MCNC: 191 PG/ML
BUN SERPL-MCNC: 14 MG/DL (ref 8–23)
BUN/CREAT SERPL: 20 (ref 9–20)
CALCIUM SERPL-MCNC: 9.4 MG/DL (ref 8.6–10.4)
CHLORIDE SERPL-SCNC: 105 MMOL/L (ref 98–107)
CO2 SERPL-SCNC: 28 MMOL/L (ref 20–31)
CREAT SERPL-MCNC: 0.7 MG/DL (ref 0.5–0.9)
EOSINOPHIL # BLD: 0.29 K/UL (ref 0–0.44)
EOSINOPHILS RELATIVE PERCENT: 7 % (ref 1–4)
ERYTHROCYTE [DISTWIDTH] IN BLOOD BY AUTOMATED COUNT: 13.9 % (ref 11.8–14.4)
EST. AVERAGE GLUCOSE BLD GHB EST-MCNC: 105 MG/DL
GFR, ESTIMATED: 90 ML/MIN/1.73M2
GLUCOSE SERPL-MCNC: 78 MG/DL (ref 70–99)
HBA1C MFR BLD: 5.3 % (ref 4–6)
HCT VFR BLD AUTO: 35 % (ref 36.3–47.1)
HGB BLD-MCNC: 11.4 G/DL (ref 11.9–15.1)
IMM GRANULOCYTES # BLD AUTO: 0.01 K/UL (ref 0–0.3)
IMM GRANULOCYTES NFR BLD: 0 %
LYMPHOCYTES NFR BLD: 1.22 K/UL (ref 1.1–3.7)
LYMPHOCYTES RELATIVE PERCENT: 29 % (ref 24–43)
MCH RBC QN AUTO: 31.3 PG (ref 25.2–33.5)
MCHC RBC AUTO-ENTMCNC: 32.6 G/DL (ref 28.4–34.8)
MCV RBC AUTO: 96.2 FL (ref 82.6–102.9)
MONOCYTES NFR BLD: 0.61 K/UL (ref 0.1–1.2)
MONOCYTES NFR BLD: 15 % (ref 3–12)
NEUTROPHILS NFR BLD: 47 % (ref 36–65)
NEUTS SEG NFR BLD: 2.02 K/UL (ref 1.5–8.1)
NRBC BLD-RTO: 0 PER 100 WBC
PLATELET # BLD AUTO: 303 K/UL (ref 138–453)
PMV BLD AUTO: 10 FL (ref 8.1–13.5)
POTASSIUM SERPL-SCNC: 3.7 MMOL/L (ref 3.7–5.3)
PROT SERPL-MCNC: 6 G/DL (ref 6.4–8.3)
RBC # BLD AUTO: 3.64 M/UL (ref 3.95–5.11)
SODIUM SERPL-SCNC: 144 MMOL/L (ref 135–144)
TSH SERPL DL<=0.05 MIU/L-ACNC: 3.27 UIU/ML (ref 0.3–5)
WBC OTHER # BLD: 4.2 K/UL (ref 3.5–11.3)

## 2024-10-14 PROCEDURE — 80053 COMPREHEN METABOLIC PANEL: CPT

## 2024-10-14 PROCEDURE — 83880 ASSAY OF NATRIURETIC PEPTIDE: CPT

## 2024-10-14 PROCEDURE — P9603 ONE-WAY ALLOW PRORATED MILES: HCPCS

## 2024-10-14 PROCEDURE — 36415 COLL VENOUS BLD VENIPUNCTURE: CPT

## 2024-10-14 PROCEDURE — 83036 HEMOGLOBIN GLYCOSYLATED A1C: CPT

## 2024-10-14 PROCEDURE — 84443 ASSAY THYROID STIM HORMONE: CPT

## 2024-10-14 PROCEDURE — 85025 COMPLETE CBC W/AUTO DIFF WBC: CPT

## 2024-10-21 ENCOUNTER — OFFICE VISIT (OUTPATIENT)
Dept: NEUROLOGY | Age: 79
End: 2024-10-21
Payer: MEDICARE

## 2024-10-21 ENCOUNTER — HOSPITAL ENCOUNTER (OUTPATIENT)
Age: 79
Setting detail: SPECIMEN
Discharge: HOME | End: 2024-10-21

## 2024-10-21 VITALS
WEIGHT: 151 LBS | BODY MASS INDEX: 29.64 KG/M2 | HEART RATE: 63 BPM | DIASTOLIC BLOOD PRESSURE: 62 MMHG | SYSTOLIC BLOOD PRESSURE: 105 MMHG | HEIGHT: 60 IN

## 2024-10-21 DIAGNOSIS — R41.89 COGNITIVE IMPAIRMENT: ICD-10-CM

## 2024-10-21 DIAGNOSIS — Z86.73 HISTORY OF ISCHEMIC STROKE: Primary | ICD-10-CM

## 2024-10-21 DIAGNOSIS — R40.4 STARING EPISODES: ICD-10-CM

## 2024-10-21 LAB
PREALB SERPL-MCNC: 25.5 MG/DL (ref 20–40)
PROT SERPL-MCNC: 7.2 G/DL (ref 6.4–8.3)

## 2024-10-21 PROCEDURE — 1123F ACP DISCUSS/DSCN MKR DOCD: CPT | Performed by: PSYCHIATRY & NEUROLOGY

## 2024-10-21 PROCEDURE — 84155 ASSAY OF PROTEIN SERUM: CPT

## 2024-10-21 PROCEDURE — 3074F SYST BP LT 130 MM HG: CPT | Performed by: PSYCHIATRY & NEUROLOGY

## 2024-10-21 PROCEDURE — G8484 FLU IMMUNIZE NO ADMIN: HCPCS | Performed by: PSYCHIATRY & NEUROLOGY

## 2024-10-21 PROCEDURE — 36415 COLL VENOUS BLD VENIPUNCTURE: CPT

## 2024-10-21 PROCEDURE — 99204 OFFICE O/P NEW MOD 45 MIN: CPT | Performed by: PSYCHIATRY & NEUROLOGY

## 2024-10-21 PROCEDURE — G8399 PT W/DXA RESULTS DOCUMENT: HCPCS | Performed by: PSYCHIATRY & NEUROLOGY

## 2024-10-21 PROCEDURE — 84134 ASSAY OF PREALBUMIN: CPT

## 2024-10-21 PROCEDURE — 1036F TOBACCO NON-USER: CPT | Performed by: PSYCHIATRY & NEUROLOGY

## 2024-10-21 PROCEDURE — 1090F PRES/ABSN URINE INCON ASSESS: CPT | Performed by: PSYCHIATRY & NEUROLOGY

## 2024-10-21 PROCEDURE — G8427 DOCREV CUR MEDS BY ELIG CLIN: HCPCS | Performed by: PSYCHIATRY & NEUROLOGY

## 2024-10-21 PROCEDURE — P9603 ONE-WAY ALLOW PRORATED MILES: HCPCS

## 2024-10-21 PROCEDURE — G8417 CALC BMI ABV UP PARAM F/U: HCPCS | Performed by: PSYCHIATRY & NEUROLOGY

## 2024-10-21 PROCEDURE — 3078F DIAST BP <80 MM HG: CPT | Performed by: PSYCHIATRY & NEUROLOGY

## 2024-10-21 RX ORDER — HYDROCODONE BITARTRATE AND ACETAMINOPHEN 5; 325 MG/1; MG/1
TABLET ORAL
COMMUNITY

## 2024-10-21 RX ORDER — LANOLIN ALCOHOL/MO/W.PET/CERES
CREAM (GRAM) TOPICAL
COMMUNITY
Start: 2024-08-21

## 2024-10-21 RX ORDER — BISMUTH SUBSALICYLATE 262MG/15ML
15 SUSPENSION, ORAL (FINAL DOSE FORM) ORAL EVERY 6 HOURS PRN
COMMUNITY
Start: 2024-09-03

## 2024-10-21 RX ORDER — METHYLPREDNISOLONE 4 MG/1
TABLET ORAL
COMMUNITY
Start: 2024-03-07

## 2024-10-21 RX ORDER — ROSUVASTATIN CALCIUM 10 MG/1
TABLET, COATED ORAL
COMMUNITY

## 2024-10-21 RX ORDER — CYCLOBENZAPRINE HCL 5 MG
TABLET ORAL
COMMUNITY

## 2024-10-21 RX ORDER — ACETAMINOPHEN 325 MG/1
TABLET ORAL
COMMUNITY
Start: 2024-08-20

## 2024-10-21 RX ORDER — LOPERAMIDE HYDROCHLORIDE 2 MG/1
2 TABLET ORAL 4 TIMES DAILY PRN
COMMUNITY
Start: 2024-08-30

## 2024-10-21 RX ORDER — PRAVASTATIN SODIUM 40 MG
40 TABLET ORAL DAILY
COMMUNITY

## 2024-10-21 RX ORDER — LOSARTAN POTASSIUM AND HYDROCHLOROTHIAZIDE 12.5; 5 MG/1; MG/1
TABLET ORAL
COMMUNITY

## 2024-10-21 RX ORDER — TRIAMCINOLONE ACETONIDE 1 MG/G
OINTMENT TOPICAL
COMMUNITY

## 2024-10-21 RX ORDER — OLMESARTAN MEDOXOMIL AND HYDROCHLOROTHIAZIDE 40/25 40; 25 MG/1; MG/1
1 TABLET ORAL DAILY
COMMUNITY
Start: 2024-10-17

## 2024-10-21 RX ORDER — PREDNISONE 10 MG/1
TABLET ORAL
COMMUNITY

## 2024-10-21 RX ORDER — ERYTHROMYCIN 5 MG/G
OINTMENT OPHTHALMIC
COMMUNITY

## 2024-10-21 RX ORDER — POLYETHYLENE GLYCOL-3350 AND ELECTROLYTES 236; 6.74; 5.86; 2.97; 22.74 G/274.31G; G/274.31G; G/274.31G; G/274.31G; G/274.31G
POWDER, FOR SOLUTION ORAL
COMMUNITY

## 2024-10-21 NOTE — PROGRESS NOTES
[facility]  [address]  [place] [address]  Ph: [phone] or [phone]  FAX: [phone]    Chief Complaint: staring episodes     Dear Meg Valero MD     I had the pleasure of seeing your patient today in neurology consultation for her symptoms. As you would recall Sruthi Sandhu is a 78 y.o. female right  handed with anxiety, depression, HTN, HLD, CABG 2024 s/p seizure, GI bleed (nonbleeding gastric ulcer), generalized weakness    Her daughter Meagan lives in [place] and she is now living in [facility] and is here with her friend Sandy walters, who is her advocate     She was seen in the hospital on 6/23/2024 for generalized weakness.  As per the daughter she has had staring episodes with eyes rolling back and unresponsive and foaming at the mouth and GI bleeding at the mouth and her BP was 60s.  She did not have any tonic-clonic activity but was having tremors and was unresponsive.  She was not on any AEDs.  She was found by the EMS not acting herself.  Vitals were stable.  CT of the head then showed bilateral basal ganglia hypodensity, right greater than the left question about prior ischemia.  CTA of the head and neck did not show any flow-limiting stenosis.  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  As per the daughter who is her primary care provider she has been hesitant about taking medications and was unsure if she was taking aspirin after her cardiac procedure in 3/20.  She was started on aspirin 81 mg in the hospital along with pravastatin 40 mg daily. She has history of statin and Zetia allergy    10/14/2024: Hemoglobin A1c was 5.3, TSH was 3.27,    EEG awake and asleep showed polymorphic theta activity in 6/24.    CT head 6/22/2024: Compared with 3/30 study; increased hypodensity along bilateral basal ganglia, right greater than left, could be due to acute ischemia.  Suggest MRI.     CTA head and neck 6/22/2024: No

## 2024-11-13 ENCOUNTER — HOSPITAL ENCOUNTER (OUTPATIENT)
Dept: MRI IMAGING | Age: 79
Discharge: HOME | End: 2024-11-15
Attending: PSYCHIATRY & NEUROLOGY
Payer: MEDICARE

## 2024-11-13 DIAGNOSIS — R40.4 STARING EPISODES: ICD-10-CM

## 2024-11-13 DIAGNOSIS — Z86.73 HISTORY OF ISCHEMIC STROKE: ICD-10-CM

## 2024-11-13 PROCEDURE — 70551 MRI BRAIN STEM W/O DYE: CPT

## 2024-12-23 ENCOUNTER — HOSPITAL ENCOUNTER (OUTPATIENT)
Age: 79
Setting detail: SPECIMEN
Discharge: HOME | End: 2024-12-23
Payer: MEDICARE

## 2024-12-23 LAB
ANION GAP SERPL CALCULATED.3IONS-SCNC: 11 MMOL/L (ref 9–16)
BUN SERPL-MCNC: 17 MG/DL (ref 8–23)
CALCIUM SERPL-MCNC: 9.6 MG/DL (ref 8.8–10.2)
CHLORIDE SERPL-SCNC: 97 MMOL/L (ref 98–107)
CO2 SERPL-SCNC: 29 MMOL/L (ref 20–31)
CREAT SERPL-MCNC: 0.8 MG/DL (ref 0.5–0.9)
GFR, ESTIMATED: 73 ML/MIN/1.73M2
GLUCOSE SERPL-MCNC: 87 MG/DL (ref 82–115)
POTASSIUM SERPL-SCNC: 3.7 MMOL/L (ref 3.7–5.3)
SODIUM SERPL-SCNC: 137 MMOL/L (ref 136–145)

## 2024-12-23 PROCEDURE — 36415 COLL VENOUS BLD VENIPUNCTURE: CPT

## 2024-12-23 PROCEDURE — P9603 ONE-WAY ALLOW PRORATED MILES: HCPCS

## 2024-12-23 PROCEDURE — 80048 BASIC METABOLIC PNL TOTAL CA: CPT

## 2025-03-21 ENCOUNTER — TELEPHONE (OUTPATIENT)
Dept: NEUROLOGY | Age: 80
End: 2025-03-21

## 2025-07-29 ENCOUNTER — HOSPITAL ENCOUNTER (OUTPATIENT)
Age: 80
Setting detail: SPECIMEN
Discharge: HOME | End: 2025-07-29
Payer: MEDICARE

## 2025-07-29 LAB
BACTERIA URNS QL MICRO: NORMAL
BILIRUB UR QL STRIP: NEGATIVE
CASTS #/AREA URNS LPF: NORMAL /LPF (ref 0–8)
CLARITY UR: CLEAR
COLOR UR: YELLOW
EPI CELLS #/AREA URNS HPF: NORMAL /HPF (ref 0–5)
GLUCOSE UR STRIP-MCNC: NEGATIVE MG/DL
HGB UR QL STRIP.AUTO: NEGATIVE
KETONES UR STRIP-MCNC: NEGATIVE MG/DL
LEUKOCYTE ESTERASE UR QL STRIP: ABNORMAL
NITRITE UR QL STRIP: NEGATIVE
PH UR STRIP: 6.5 [PH] (ref 5–8)
PROT UR STRIP-MCNC: NEGATIVE MG/DL
RBC #/AREA URNS HPF: NORMAL /HPF (ref 0–4)
SP GR UR STRIP: 1.02 (ref 1–1.03)
UROBILINOGEN UR STRIP-ACNC: NORMAL EU/DL (ref 0–1)
WBC #/AREA URNS HPF: NORMAL /HPF (ref 0–5)

## 2025-07-29 PROCEDURE — 87077 CULTURE AEROBIC IDENTIFY: CPT

## 2025-07-29 PROCEDURE — 87086 URINE CULTURE/COLONY COUNT: CPT

## 2025-07-29 PROCEDURE — 81001 URINALYSIS AUTO W/SCOPE: CPT

## 2025-07-29 PROCEDURE — 87186 SC STD MICRODIL/AGAR DIL: CPT

## 2025-07-31 LAB
MICROORGANISM SPEC CULT: ABNORMAL
SPECIMEN DESCRIPTION: ABNORMAL

## 2025-08-18 ENCOUNTER — HOSPITAL ENCOUNTER (OUTPATIENT)
Age: 80
Setting detail: SPECIMEN
Discharge: HOME | End: 2025-08-18
Payer: MEDICARE

## 2025-08-18 LAB
EST. AVERAGE GLUCOSE BLD GHB EST-MCNC: 120 MG/DL
FERRITIN SERPL-MCNC: 123 NG/ML
HBA1C MFR BLD: 5.8 % (ref 4–6)
IRON SATN MFR SERPL: 32 % (ref 20–55)
IRON SERPL-MCNC: 93 UG/DL (ref 37–145)
TIBC SERPL-MCNC: 294 UG/DL (ref 250–450)
UNSATURATED IRON BINDING CAPACITY: 201 UG/DL (ref 112–347)

## 2025-08-18 PROCEDURE — 83540 ASSAY OF IRON: CPT

## 2025-08-18 PROCEDURE — 82728 ASSAY OF FERRITIN: CPT

## 2025-08-18 PROCEDURE — 83550 IRON BINDING TEST: CPT

## 2025-08-18 PROCEDURE — 36415 COLL VENOUS BLD VENIPUNCTURE: CPT

## 2025-08-18 PROCEDURE — 83036 HEMOGLOBIN GLYCOSYLATED A1C: CPT
